# Patient Record
Sex: MALE | Race: WHITE | NOT HISPANIC OR LATINO | Employment: OTHER | URBAN - NONMETROPOLITAN AREA
[De-identification: names, ages, dates, MRNs, and addresses within clinical notes are randomized per-mention and may not be internally consistent; named-entity substitution may affect disease eponyms.]

---

## 2017-01-16 ENCOUNTER — TELEPHONE (OUTPATIENT)
Dept: CARDIOLOGY | Facility: CLINIC | Age: 67
End: 2017-01-16

## 2017-01-16 RX ORDER — TADALAFIL 5 MG/1
5 TABLET ORAL DAILY PRN
Qty: 30 TABLET | Refills: 2 | COMMUNITY
Start: 2017-01-16 | End: 2017-01-25 | Stop reason: SDUPTHER

## 2017-01-16 NOTE — TELEPHONE ENCOUNTER
Patient called requesting script for Cialis, you had gave samples at last visit. Is it ok to sent script? Thanks

## 2017-01-25 RX ORDER — TADALAFIL 5 MG/1
5 TABLET ORAL DAILY PRN
Qty: 30 TABLET | Refills: 2 | Status: SHIPPED | OUTPATIENT
Start: 2017-01-25 | End: 2017-06-26

## 2017-06-26 ENCOUNTER — OFFICE VISIT (OUTPATIENT)
Dept: CARDIOLOGY | Facility: CLINIC | Age: 67
End: 2017-06-26

## 2017-06-26 VITALS
HEIGHT: 71 IN | BODY MASS INDEX: 32.9 KG/M2 | SYSTOLIC BLOOD PRESSURE: 144 MMHG | HEART RATE: 64 BPM | DIASTOLIC BLOOD PRESSURE: 82 MMHG | WEIGHT: 235 LBS

## 2017-06-26 DIAGNOSIS — I25.9 IHD (ISCHEMIC HEART DISEASE): Primary | ICD-10-CM

## 2017-06-26 DIAGNOSIS — R01.1 MURMUR, CARDIAC: ICD-10-CM

## 2017-06-26 DIAGNOSIS — E78.00 HYPERCHOLESTEREMIA: ICD-10-CM

## 2017-06-26 DIAGNOSIS — E55.9 VITAMIN D DEFICIENCY: ICD-10-CM

## 2017-06-26 DIAGNOSIS — F52.21 ED (ERECTILE DYSFUNCTION) OF NON-ORGANIC ORIGIN: ICD-10-CM

## 2017-06-26 DIAGNOSIS — E88.81 METABOLIC SYNDROME: ICD-10-CM

## 2017-06-26 DIAGNOSIS — I10 ESSENTIAL HYPERTENSION: ICD-10-CM

## 2017-06-26 DIAGNOSIS — R79.89 LOW TESTOSTERONE: ICD-10-CM

## 2017-06-26 DIAGNOSIS — J32.8 OTHER CHRONIC SINUSITIS: ICD-10-CM

## 2017-06-26 PROBLEM — J32.9 CHRONIC SINUSITIS: Status: ACTIVE | Noted: 2017-06-26

## 2017-06-26 PROCEDURE — 99213 OFFICE O/P EST LOW 20 MIN: CPT | Performed by: INTERNAL MEDICINE

## 2017-06-26 RX ORDER — LABETALOL 200 MG/1
TABLET, FILM COATED ORAL
Qty: 180 TABLET | Refills: 2 | Status: SHIPPED | OUTPATIENT
Start: 2017-06-26 | End: 2018-01-23 | Stop reason: SDUPTHER

## 2017-06-26 RX ORDER — EZETIMIBE 10 MG/1
TABLET ORAL
Qty: 90 TABLET | Refills: 2 | Status: SHIPPED | OUTPATIENT
Start: 2017-06-26 | End: 2018-01-23 | Stop reason: SDUPTHER

## 2017-06-26 RX ORDER — CLOPIDOGREL BISULFATE 75 MG/1
TABLET ORAL
Qty: 90 TABLET | Refills: 2 | Status: SHIPPED | OUTPATIENT
Start: 2017-06-26 | End: 2018-01-23 | Stop reason: SDUPTHER

## 2017-06-26 RX ORDER — AMLODIPINE BESYLATE AND BENAZEPRIL HYDROCHLORIDE 10; 20 MG/1; MG/1
CAPSULE ORAL
Qty: 90 CAPSULE | Refills: 2 | Status: SHIPPED | OUTPATIENT
Start: 2017-06-26 | End: 2018-01-23 | Stop reason: SDUPTHER

## 2017-06-26 RX ORDER — ATORVASTATIN CALCIUM 20 MG/1
TABLET, FILM COATED ORAL
Qty: 90 TABLET | Refills: 2 | Status: SHIPPED | OUTPATIENT
Start: 2017-06-26 | End: 2017-06-26

## 2017-06-26 RX ORDER — MONTELUKAST SODIUM 10 MG/1
10 TABLET ORAL NIGHTLY
Qty: 90 TABLET | Refills: 3 | Status: SHIPPED | OUTPATIENT
Start: 2017-06-26 | End: 2018-01-23 | Stop reason: SDUPTHER

## 2017-06-26 RX ORDER — LOSARTAN POTASSIUM AND HYDROCHLOROTHIAZIDE 25; 100 MG/1; MG/1
TABLET ORAL
Qty: 90 TABLET | Refills: 2 | Status: SHIPPED | OUTPATIENT
Start: 2017-06-26 | End: 2018-01-23 | Stop reason: SDUPTHER

## 2017-06-26 NOTE — PROGRESS NOTES
Chief Complaint   Patient presents with   • Follow-up     Denies chest pain, shortness of breath, or palpitations. All his meds were refilled today. Labs per his insurance wellness program about a month ago.        CARDIAC COMPLAINTS  Sinusitis        Subjective   Edgardo Ortez is a 67 y.o. male came in today for his follow-up visit.  He has history of ischemic heart disease diagnosed in 2005 when he underwent PTCA of the LAD and diagonal, followed by stenting again in 2006.  Since then his cardiac workup is been always negative.  The last echo and stress test was in 2015.  Came today with no new cardiac symptoms.  He does have sinus problem, and has taken some prednisone and antibiotics, but still continues to have a allergic sinusitis.  He apparently had some labs done few months ago, but not sure about the results.              Cardiac History  Past Surgical History:   Procedure Laterality Date   • CARDIAC CATHETERIZATION  2005    PCI of LAD & D1     • CARDIAC CATHETERIZATION  11/02/2006    75% LAD- 3.0 x 33 mm Cypher. 70% D1 2.5 x 18 mm Cypher Stent.     • CARDIOVASCULAR STRESS TEST  05/29/2007    10 Min 75% THR.Negative     • CARDIOVASCULAR STRESS TEST  05/18/2009    10 min, 98% tHR. Negative     • CARDIOVASCULAR STRESS TEST  07/12/2010    10 min. 83% THR. BP- 160/70. Anterior Ischemia.     • CARDIOVASCULAR STRESS TEST  09/08/2011    10 Min.85%THR. Negative     • CARDIOVASCULAR STRESS TEST  08/13/2013    10 Min,15 Secs. 89% THR. BP- 172/90. Negative.     • CARDIOVASCULAR STRESS TEST  04/13/2015    10 Min, 84% THR. BP- 184/78. Negative     • CONVERTED (HISTORICAL) DUPLEX SCANS  04/13/2015    Carotid US- Normal.     • ECHO - CONVERTED  05/29/2007     EF 65%     • ECHO - CONVERTED  05/18/2009    EF >60%, Mild MR.     • ECHO - CONVERTED  07/12/2010    EF >60%, RVSP- 32.09mmHg.     • ECHO - CONVERTED  09/08/2011     EF 65%.     • ECHO - CONVERTED  08/13/2013    EF 65%. RVSp- 39 mmHg.     • ECHO - CONVERTED   04/13/2015    EF 65%         Current Outpatient Prescriptions   Medication Sig Dispense Refill   • amLODIPine-benazepril (LOTREL) 10-20 MG per capsule TAKE ONE CAPSULE BY MOUTH EVERY DAY 90 capsule 2   • aspirin 81 MG EC tablet Take 81 mg by mouth Daily.     • atorvastatin (LIPITOR) 10 MG tablet Take 10 mg by mouth Daily.     • clopidogrel (PLAVIX) 75 MG tablet TAKE ONE TABLET BY MOUTH EVERY DAY 90 tablet 2   • labetalol (NORMODYNE) 200 MG tablet TAKE TWO TABLETS BY MOUTH ONCE DAILY 180 tablet 2   • losartan-hydrochlorothiazide (HYZAAR) 100-25 MG per tablet TAKE ONE TABLET BY MOUTH EVERY DAY 90 tablet 2   • ZETIA 10 MG tablet TAKE ONE TABLET BY MOUTH EVERY DAY 90 tablet 2   • montelukast (SINGULAIR) 10 MG tablet Take 1 tablet by mouth Every Night. 90 tablet 3     No current facility-administered medications for this visit.        Allergies  :  Review of patient's allergies indicates no known allergies.       Past Medical History:   Diagnosis Date   • H/O prostate biopsy     negative   • Hypercholesterolemia    • Hypertension    • IHD (ischemic heart disease)     S/P stenting of LAD & diagonal   • MR (mitral regurgitation)     mild   • pulse oximetry 07/07/2010    Overnight oximetry- Abnormal, sleep study ordered, pt. wants to lose wt. first.       Social History     Social History   • Marital status:      Spouse name: N/A   • Number of children: N/A   • Years of education: N/A     Occupational History   • Not on file.     Social History Main Topics   • Smoking status: Never Smoker   • Smokeless tobacco: Never Used   • Alcohol use Yes      Comment: states he drinks beer or scotch every other day   • Drug use: No   • Sexual activity: Not on file     Other Topics Concern   • Not on file     Social History Narrative       Family History   Problem Relation Age of Onset   • Heart disease Mother    • Heart disease Father        Review of Systems   Constitution: Negative for decreased appetite and malaise/fatigue.  "  HENT: Positive for congestion and headaches.    Eyes: Negative for blurred vision.   Cardiovascular: Negative for chest pain, leg swelling and orthopnea.   Respiratory: Negative for shortness of breath and snoring.    Endocrine: Negative for cold intolerance and heat intolerance.   Hematologic/Lymphatic: Negative for adenopathy. Does not bruise/bleed easily.   Skin: Negative for itching, nail changes and skin cancer.   Musculoskeletal: Negative for arthritis and myalgias.   Gastrointestinal: Negative for abdominal pain, dysphagia and heartburn.   Genitourinary: Negative for bladder incontinence and frequency.   Neurological: Negative for dizziness, light-headedness, seizures and vertigo.   Psychiatric/Behavioral: Negative for altered mental status.   Allergic/Immunologic: Negative for environmental allergies and hives.       Diabetes- No  Thyroid- normal    Objective     /82  Pulse 64  Ht 71\" (180.3 cm)  Wt 235 lb (107 kg)  BMI 32.78 kg/m2    Physical Exam   Constitutional: He is oriented to person, place, and time. He appears well-nourished.   HENT:   Head: Normocephalic.   Eyes: Pupils are equal, round, and reactive to light.   Neck: Normal range of motion.   Cardiovascular: Normal rate, regular rhythm, S1 normal and S2 normal.    Murmur heard.  Pulmonary/Chest: Breath sounds normal.   Abdominal: Soft. Bowel sounds are normal.   Musculoskeletal: Normal range of motion.   Neurological: He is alert and oriented to person, place, and time.   Skin: Skin is warm.   Psychiatric: He has a normal mood and affect.     Procedures            Assessment/Plan     Edgardo was seen today for follow-up.    Diagnoses and all orders for this visit:    IHD (ischemic heart disease)  -     CBC & Differential; Future  -     High Sensitivity CRP; Future    Essential hypertension  -     Comprehensive Metabolic Panel; Future    Hypercholesteremia  -     Lipid Panel; Future    Metabolic syndrome    Murmur, cardiac    Other " chronic sinusitis  -     montelukast (SINGULAIR) 10 MG tablet; Take 1 tablet by mouth Every Night.    Low testosterone  -     Testosterone; Future    Vitamin D deficiency  -     Vitamin D 1,25 Dihydroxy; Future       His heart rate and blood pressure appears stable.  BMI is still around 33.  Talked to him about diet exercise and weight reduction.  Continue the same medications for now.  Recheck his labs including electrolytes and LFT's.  I started him on singular 10 mg once a day to help with his sinus problem and advised him to take Claritin in the morning, overall his cardiac status appears stable,                  Electronically signed by Ovidio Warren MD June 26, 2017 4:09 PM

## 2018-01-23 ENCOUNTER — OFFICE VISIT (OUTPATIENT)
Dept: CARDIOLOGY | Facility: CLINIC | Age: 68
End: 2018-01-23

## 2018-01-23 VITALS
BODY MASS INDEX: 32.9 KG/M2 | SYSTOLIC BLOOD PRESSURE: 138 MMHG | WEIGHT: 235 LBS | DIASTOLIC BLOOD PRESSURE: 82 MMHG | HEIGHT: 71 IN | HEART RATE: 64 BPM

## 2018-01-23 DIAGNOSIS — R05.9 COUGH: ICD-10-CM

## 2018-01-23 DIAGNOSIS — I10 ESSENTIAL HYPERTENSION: ICD-10-CM

## 2018-01-23 DIAGNOSIS — J32.8 OTHER CHRONIC SINUSITIS: ICD-10-CM

## 2018-01-23 DIAGNOSIS — I25.9 IHD (ISCHEMIC HEART DISEASE): Primary | ICD-10-CM

## 2018-01-23 DIAGNOSIS — E88.81 METABOLIC SYNDROME: ICD-10-CM

## 2018-01-23 DIAGNOSIS — E78.00 HYPERCHOLESTEREMIA: ICD-10-CM

## 2018-01-23 PROBLEM — E88.810 METABOLIC SYNDROME: Status: ACTIVE | Noted: 2018-01-23

## 2018-01-23 PROCEDURE — 99213 OFFICE O/P EST LOW 20 MIN: CPT | Performed by: INTERNAL MEDICINE

## 2018-01-23 RX ORDER — EZETIMIBE 10 MG/1
10 TABLET ORAL DAILY
Qty: 90 TABLET | Refills: 3 | Status: SHIPPED | OUTPATIENT
Start: 2018-01-23 | End: 2018-07-23 | Stop reason: SDUPTHER

## 2018-01-23 RX ORDER — ATORVASTATIN CALCIUM 20 MG/1
20 TABLET, FILM COATED ORAL DAILY
Qty: 90 TABLET | Refills: 3 | Status: SHIPPED | OUTPATIENT
Start: 2018-01-23 | End: 2018-07-23 | Stop reason: SDUPTHER

## 2018-01-23 RX ORDER — MONTELUKAST SODIUM 10 MG/1
10 TABLET ORAL NIGHTLY
Qty: 90 TABLET | Refills: 3 | Status: SHIPPED | OUTPATIENT
Start: 2018-01-23 | End: 2018-07-23 | Stop reason: SDUPTHER

## 2018-01-23 RX ORDER — AMLODIPINE BESYLATE AND BENAZEPRIL HYDROCHLORIDE 10; 20 MG/1; MG/1
1 CAPSULE ORAL DAILY
Qty: 90 CAPSULE | Refills: 3 | Status: SHIPPED | OUTPATIENT
Start: 2018-01-23 | End: 2018-07-23 | Stop reason: SDUPTHER

## 2018-01-23 RX ORDER — LABETALOL 200 MG/1
200 TABLET, FILM COATED ORAL EVERY 12 HOURS SCHEDULED
Qty: 180 TABLET | Refills: 3 | Status: SHIPPED | OUTPATIENT
Start: 2018-01-23 | End: 2018-07-23 | Stop reason: SDUPTHER

## 2018-01-23 RX ORDER — LOSARTAN POTASSIUM AND HYDROCHLOROTHIAZIDE 25; 100 MG/1; MG/1
1 TABLET ORAL DAILY
Qty: 90 TABLET | Refills: 3 | Status: SHIPPED | OUTPATIENT
Start: 2018-01-23 | End: 2018-07-23 | Stop reason: SDUPTHER

## 2018-01-23 RX ORDER — ATORVASTATIN CALCIUM 20 MG/1
20 TABLET, FILM COATED ORAL DAILY
COMMUNITY
End: 2018-01-23 | Stop reason: SDUPTHER

## 2018-01-23 RX ORDER — CLOPIDOGREL BISULFATE 75 MG/1
75 TABLET ORAL DAILY
Qty: 90 TABLET | Refills: 3 | Status: SHIPPED | OUTPATIENT
Start: 2018-01-23 | End: 2018-07-23 | Stop reason: SDUPTHER

## 2018-01-23 NOTE — PROGRESS NOTES
Chief Complaint   Patient presents with   • Follow-up     for cardiac management   • Med Refill     refills needed on cardiac meds. 90 days to [facility].        CARDIAC COMPLAINTS  Cardiac Management        Subjective   Edgardo Ortez is a 67 y.o. male came in today for his follow-up visit.  He has history of ischemic heart disease diagnosed in 2005 when he underwent stenting of the LAD followed by repeat stenting in 2006.  His last cardiac workup was in 2015.  He came today with no new symptoms.  His last lab work was about 6 months ago and it was normal.  He denies having any chest pain or shortness of breath.  He has some recent sinus congestion and has some cough.  He denies having any fever.  He did hit his flu vaccine.  He has not had his pneumonia vaccine.              Cardiac History  Past Surgical History:   Procedure Laterality Date   • CARDIAC CATHETERIZATION  2005    PCI of LAD & D1     • CARDIAC CATHETERIZATION  11/02/2006    75% LAD- 3.0 x 33 mm Cypher. 70% D1 2.5 x 18 mm Cypher Stent.     • CARDIOVASCULAR STRESS TEST  05/29/2007    10 Min 75% THR.Negative     • CARDIOVASCULAR STRESS TEST  05/18/2009    10 min, 98% tHR. Negative     • CARDIOVASCULAR STRESS TEST  07/12/2010    10 min. 83% THR. BP- 160/70. Anterior Ischemia.     • CARDIOVASCULAR STRESS TEST  09/08/2011    10 Min.85%THR. Negative     • CARDIOVASCULAR STRESS TEST  08/13/2013    10 Min,15 Secs. 89% THR. BP- 172/90. Negative.     • CARDIOVASCULAR STRESS TEST  04/13/2015    10 Min, 84% THR. BP- 184/78. Negative     • CONVERTED (HISTORICAL) DUPLEX SCANS  04/13/2015    Carotid US- Normal.     • ECHO - CONVERTED  05/29/2007     EF 65%     • ECHO - CONVERTED  05/18/2009    EF >60%, Mild MR.     • ECHO - CONVERTED  07/12/2010    EF >60%, RVSP- 32.09mmHg.     • ECHO - CONVERTED  09/08/2011     EF 65%.     • ECHO - CONVERTED  08/13/2013    EF 65%. RVSp- 39 mmHg.     • ECHO - CONVERTED  04/13/2015    EF 65%         Current Outpatient  Prescriptions   Medication Sig Dispense Refill   • amLODIPine-benazepril (LOTREL) 10-20 MG per capsule Take 1 capsule by mouth Daily. 90 capsule 3   • aspirin 81 MG EC tablet Take 81 mg by mouth Daily.     • atorvastatin (LIPITOR) 20 MG tablet Take 1 tablet by mouth Daily. 90 tablet 3   • clopidogrel (PLAVIX) 75 MG tablet Take 1 tablet by mouth Daily. 90 tablet 3   • ezetimibe (ZETIA) 10 MG tablet Take 1 tablet by mouth Daily. 90 tablet 3   • labetalol (NORMODYNE) 200 MG tablet Take 1 tablet by mouth Every 12 (Twelve) Hours. 180 tablet 3   • losartan-hydrochlorothiazide (HYZAAR) 100-25 MG per tablet Take 1 tablet by mouth Daily. 90 tablet 3   • montelukast (SINGULAIR) 10 MG tablet Take 1 tablet by mouth Every Night. 90 tablet 3     No current facility-administered medications for this visit.        Allergies  :  Review of patient's allergies indicates no known allergies.       Past Medical History:   Diagnosis Date   • H/O prostate biopsy     negative   • Hypercholesterolemia    • Hypertension    • IHD (ischemic heart disease)     S/P stenting of LAD & diagonal   • MR (mitral regurgitation)     mild   • pulse oximetry 07/07/2010    Overnight oximetry- Abnormal, sleep study ordered, pt. wants to lose wt. first.       Social History     Social History   • Marital status:      Spouse name: N/A   • Number of children: N/A   • Years of education: N/A     Occupational History   • Not on file.     Social History Main Topics   • Smoking status: Never Smoker   • Smokeless tobacco: Never Used   • Alcohol use Yes      Comment: states he drinks beer or scotch every other day   • Drug use: No   • Sexual activity: Not on file     Other Topics Concern   • Not on file     Social History Narrative       Family History   Problem Relation Age of Onset   • Heart disease Mother    • Heart disease Father        Review of Systems   Constitution: Negative for decreased appetite and malaise/fatigue.   HENT: Positive for congestion.  "Negative for sore throat.    Eyes: Negative for blurred vision.   Cardiovascular: Negative for chest pain and palpitations.   Respiratory: Positive for cough. Negative for shortness of breath and snoring.    Endocrine: Negative for cold intolerance and heat intolerance.   Hematologic/Lymphatic: Negative for adenopathy. Does not bruise/bleed easily.   Skin: Negative for itching, nail changes and skin cancer.   Musculoskeletal: Negative for arthritis and myalgias.   Gastrointestinal: Negative for abdominal pain, dysphagia and heartburn.   Genitourinary: Negative for bladder incontinence and frequency.   Neurological: Negative for dizziness, light-headedness, seizures and vertigo.   Psychiatric/Behavioral: Negative for altered mental status.   Allergic/Immunologic: Negative for environmental allergies and hives.       Diabetes- No  Thyroid- normal    Objective     /82  Pulse 64  Ht 180.3 cm (71\")  Wt 107 kg (235 lb)  BMI 32.78 kg/m2    Physical Exam   Constitutional: He is oriented to person, place, and time. He appears well-developed and well-nourished.   HENT:   Head: Normocephalic.   Nose: Nose normal.   Eyes: Pupils are equal, round, and reactive to light.   Neck: Normal range of motion.   Cardiovascular: Normal rate, regular rhythm, S1 normal and S2 normal.    No murmur heard.  Pulmonary/Chest: Effort normal and breath sounds normal.   Abdominal: Soft. Bowel sounds are normal.   Musculoskeletal: Normal range of motion. He exhibits no edema.   Neurological: He is alert and oriented to person, place, and time.   Skin: Skin is warm and dry.   Psychiatric: He has a normal mood and affect.     Procedures            Assessment/Plan     Edgardo was seen today for follow-up and med refill.    Diagnoses and all orders for this visit:    IHD (ischemic heart disease)  -     clopidogrel (PLAVIX) 75 MG tablet; Take 1 tablet by mouth Daily.  -     CBC & Differential; Future    Essential hypertension  -     " losartan-hydrochlorothiazide (HYZAAR) 100-25 MG per tablet; Take 1 tablet by mouth Daily.  -     labetalol (NORMODYNE) 200 MG tablet; Take 1 tablet by mouth Every 12 (Twelve) Hours.  -     amLODIPine-benazepril (LOTREL) 10-20 MG per capsule; Take 1 capsule by mouth Daily.  -     Comprehensive Metabolic Panel; Future    Hypercholesteremia  -     ezetimibe (ZETIA) 10 MG tablet; Take 1 tablet by mouth Daily.  -     atorvastatin (LIPITOR) 20 MG tablet; Take 1 tablet by mouth Daily.  -     Lipid Panel; Future    Metabolic syndrome    Cough    Other chronic sinusitis  -     montelukast (SINGULAIR) 10 MG tablet; Take 1 tablet by mouth Every Night.       Heart rate and blood pressure appears stable.  His BMI is still around 33.  I talked to him again about his diet especially low carb diet.  Continue the same medication for now.  At this time I don't think he needs to undergo any more cardiac workup.  I did advise him to repeat his labs.  Prescription for his medications were given.  I talked to him about getting a pneumonia vaccine also.  I'll see him back in 6 months or sooner if needed.                  Electronically signed by Ovidio Warren MD January 23, 2018 4:36 PM

## 2018-07-23 ENCOUNTER — LAB (OUTPATIENT)
Dept: LAB | Facility: HOSPITAL | Age: 68
End: 2018-07-23
Attending: INTERNAL MEDICINE

## 2018-07-23 ENCOUNTER — OFFICE VISIT (OUTPATIENT)
Dept: CARDIOLOGY | Facility: CLINIC | Age: 68
End: 2018-07-23

## 2018-07-23 VITALS
WEIGHT: 236 LBS | SYSTOLIC BLOOD PRESSURE: 160 MMHG | HEART RATE: 68 BPM | DIASTOLIC BLOOD PRESSURE: 88 MMHG | HEIGHT: 71 IN | BODY MASS INDEX: 33.04 KG/M2

## 2018-07-23 DIAGNOSIS — R01.1 MURMUR, CARDIAC: ICD-10-CM

## 2018-07-23 DIAGNOSIS — I25.9 IHD (ISCHEMIC HEART DISEASE): ICD-10-CM

## 2018-07-23 DIAGNOSIS — I25.9 IHD (ISCHEMIC HEART DISEASE): Primary | ICD-10-CM

## 2018-07-23 DIAGNOSIS — E78.00 HYPERCHOLESTEREMIA: ICD-10-CM

## 2018-07-23 DIAGNOSIS — I10 ESSENTIAL HYPERTENSION: ICD-10-CM

## 2018-07-23 DIAGNOSIS — J32.8 OTHER CHRONIC SINUSITIS: ICD-10-CM

## 2018-07-23 DIAGNOSIS — E88.81 METABOLIC SYNDROME: ICD-10-CM

## 2018-07-23 DIAGNOSIS — I34.0 MITRAL VALVE INSUFFICIENCY, UNSPECIFIED ETIOLOGY: ICD-10-CM

## 2018-07-23 LAB
ALBUMIN SERPL-MCNC: 4.5 G/DL (ref 3.4–4.8)
ALBUMIN/GLOB SERPL: 1.7 G/DL (ref 1.5–2.5)
ALP SERPL-CCNC: 73 U/L (ref 40–129)
ALT SERPL W P-5'-P-CCNC: 20 U/L (ref 10–44)
ANION GAP SERPL CALCULATED.3IONS-SCNC: 6.4 MMOL/L (ref 3.6–11.2)
AST SERPL-CCNC: 23 U/L (ref 10–34)
BASOPHILS # BLD AUTO: 0.05 10*3/MM3 (ref 0–0.3)
BASOPHILS NFR BLD AUTO: 0.8 % (ref 0–2)
BILIRUB SERPL-MCNC: 0.6 MG/DL (ref 0.2–1.8)
BUN BLD-MCNC: 17 MG/DL (ref 7–21)
BUN/CREAT SERPL: 17.3 (ref 7–25)
CALCIUM SPEC-SCNC: 9.5 MG/DL (ref 7.7–10)
CHLORIDE SERPL-SCNC: 105 MMOL/L (ref 99–112)
CHOLEST SERPL-MCNC: 124 MG/DL (ref 0–200)
CO2 SERPL-SCNC: 30.6 MMOL/L (ref 24.3–31.9)
CREAT BLD-MCNC: 0.98 MG/DL (ref 0.43–1.29)
DEPRECATED RDW RBC AUTO: 39.8 FL (ref 37–54)
EOSINOPHIL # BLD AUTO: 0.3 10*3/MM3 (ref 0–0.7)
EOSINOPHIL NFR BLD AUTO: 5 % (ref 0–7)
ERYTHROCYTE [DISTWIDTH] IN BLOOD BY AUTOMATED COUNT: 12.9 % (ref 11.5–14.5)
GFR SERPL CREATININE-BSD FRML MDRD: 76 ML/MIN/1.73
GLOBULIN UR ELPH-MCNC: 2.6 GM/DL
GLUCOSE BLD-MCNC: 104 MG/DL (ref 70–110)
HCT VFR BLD AUTO: 40.2 % (ref 42–52)
HDLC SERPL-MCNC: 51 MG/DL (ref 60–100)
HGB BLD-MCNC: 13.8 G/DL (ref 14–18)
IMM GRANULOCYTES # BLD: 0.01 10*3/MM3 (ref 0–0.03)
IMM GRANULOCYTES NFR BLD: 0.2 % (ref 0–0.5)
LDLC SERPL CALC-MCNC: 61 MG/DL (ref 0–100)
LDLC/HDLC SERPL: 1.19 {RATIO}
LYMPHOCYTES # BLD AUTO: 1.25 10*3/MM3 (ref 1–3)
LYMPHOCYTES NFR BLD AUTO: 20.7 % (ref 16–46)
MCH RBC QN AUTO: 30 PG (ref 27–33)
MCHC RBC AUTO-ENTMCNC: 34.3 G/DL (ref 33–37)
MCV RBC AUTO: 87.4 FL (ref 80–94)
MONOCYTES # BLD AUTO: 0.54 10*3/MM3 (ref 0.1–0.9)
MONOCYTES NFR BLD AUTO: 8.9 % (ref 0–12)
NEUTROPHILS # BLD AUTO: 3.9 10*3/MM3 (ref 1.4–6.5)
NEUTROPHILS NFR BLD AUTO: 64.4 % (ref 40–75)
OSMOLALITY SERPL CALC.SUM OF ELEC: 285 MOSM/KG (ref 273–305)
PLATELET # BLD AUTO: 268 10*3/MM3 (ref 130–400)
PMV BLD AUTO: 10.1 FL (ref 6–10)
POTASSIUM BLD-SCNC: 3.8 MMOL/L (ref 3.5–5.3)
PROT SERPL-MCNC: 7.1 G/DL (ref 6–8)
RBC # BLD AUTO: 4.6 10*6/MM3 (ref 4.7–6.1)
SODIUM BLD-SCNC: 142 MMOL/L (ref 135–153)
TRIGL SERPL-MCNC: 61 MG/DL (ref 0–150)
VLDLC SERPL-MCNC: 12.2 MG/DL
WBC NRBC COR # BLD: 6.05 10*3/MM3 (ref 4.5–12.5)

## 2018-07-23 PROCEDURE — 99213 OFFICE O/P EST LOW 20 MIN: CPT | Performed by: INTERNAL MEDICINE

## 2018-07-23 PROCEDURE — 85025 COMPLETE CBC W/AUTO DIFF WBC: CPT | Performed by: INTERNAL MEDICINE

## 2018-07-23 PROCEDURE — 36415 COLL VENOUS BLD VENIPUNCTURE: CPT

## 2018-07-23 PROCEDURE — 80061 LIPID PANEL: CPT | Performed by: INTERNAL MEDICINE

## 2018-07-23 PROCEDURE — 80053 COMPREHEN METABOLIC PANEL: CPT | Performed by: INTERNAL MEDICINE

## 2018-07-23 RX ORDER — AMLODIPINE BESYLATE AND BENAZEPRIL HYDROCHLORIDE 10; 20 MG/1; MG/1
1 CAPSULE ORAL DAILY
Qty: 90 CAPSULE | Refills: 3 | Status: SHIPPED | OUTPATIENT
Start: 2018-07-23 | End: 2019-01-22 | Stop reason: SDUPTHER

## 2018-07-23 RX ORDER — LOSARTAN POTASSIUM AND HYDROCHLOROTHIAZIDE 25; 100 MG/1; MG/1
1 TABLET ORAL DAILY
Qty: 90 TABLET | Refills: 3 | Status: SHIPPED | OUTPATIENT
Start: 2018-07-23 | End: 2019-01-22 | Stop reason: SDUPTHER

## 2018-07-23 RX ORDER — LABETALOL 200 MG/1
200 TABLET, FILM COATED ORAL EVERY 12 HOURS SCHEDULED
Qty: 180 TABLET | Refills: 3 | Status: SHIPPED | OUTPATIENT
Start: 2018-07-23 | End: 2019-01-22 | Stop reason: SDUPTHER

## 2018-07-23 RX ORDER — ATORVASTATIN CALCIUM 20 MG/1
20 TABLET, FILM COATED ORAL DAILY
Qty: 90 TABLET | Refills: 3 | Status: SHIPPED | OUTPATIENT
Start: 2018-07-23 | End: 2019-01-22 | Stop reason: SDUPTHER

## 2018-07-23 RX ORDER — EZETIMIBE 10 MG/1
10 TABLET ORAL DAILY
Qty: 90 TABLET | Refills: 3 | Status: SHIPPED | OUTPATIENT
Start: 2018-07-23 | End: 2019-01-22 | Stop reason: SDUPTHER

## 2018-07-23 RX ORDER — CLOPIDOGREL BISULFATE 75 MG/1
75 TABLET ORAL DAILY
Qty: 90 TABLET | Refills: 3 | Status: SHIPPED | OUTPATIENT
Start: 2018-07-23 | End: 2019-01-22 | Stop reason: SDUPTHER

## 2018-07-23 RX ORDER — MONTELUKAST SODIUM 10 MG/1
10 TABLET ORAL NIGHTLY
Qty: 90 TABLET | Refills: 3 | Status: SHIPPED | OUTPATIENT
Start: 2018-07-23 | End: 2019-01-22 | Stop reason: SDUPTHER

## 2018-07-23 NOTE — PATIENT INSTRUCTIONS
"DASH Eating Plan  DASH stands for \"Dietary Approaches to Stop Hypertension.\" The DASH eating plan is a healthy eating plan that has been shown to reduce high blood pressure (hypertension). It may also reduce your risk for type 2 diabetes, heart disease, and stroke. The DASH eating plan may also help with weight loss.  What are tips for following this plan?  General guidelines  · Avoid eating more than 2,300 mg (milligrams) of salt (sodium) a day. If you have hypertension, you may need to reduce your sodium intake to 1,500 mg a day.  · Limit alcohol intake to no more than 1 drink a day for nonpregnant women and 2 drinks a day for men. One drink equals 12 oz of beer, 5 oz of wine, or 1½ oz of hard liquor.  · Work with your health care provider to maintain a healthy body weight or to lose weight. Ask what an ideal weight is for you.  · Get at least 30 minutes of exercise that causes your heart to beat faster (aerobic exercise) most days of the week. Activities may include walking, swimming, or biking.  · Work with your health care provider or diet and nutrition specialist (dietitian) to adjust your eating plan to your individual calorie needs.  Reading food labels  · Check food labels for the amount of sodium per serving. Choose foods with less than 5 percent of the Daily Value of sodium. Generally, foods with less than 300 mg of sodium per serving fit into this eating plan.  · To find whole grains, look for the word \"whole\" as the first word in the ingredient list.  Shopping  · Buy products labeled as \"low-sodium\" or \"no salt added.\"  · Buy fresh foods. Avoid canned foods and premade or frozen meals.  Cooking  · Avoid adding salt when cooking. Use salt-free seasonings or herbs instead of table salt or sea salt. Check with your health care provider or pharmacist before using salt substitutes.  · Do not phan foods. Cook foods using healthy methods such as baking, boiling, grilling, and broiling instead.  · Cook with " heart-healthy oils, such as olive, canola, soybean, or sunflower oil.  Meal planning    · Eat a balanced diet that includes:  ? 5 or more servings of fruits and vegetables each day. At each meal, try to fill half of your plate with fruits and vegetables.  ? Up to 6-8 servings of whole grains each day.  ? Less than 6 oz of lean meat, poultry, or fish each day. A 3-oz serving of meat is about the same size as a deck of cards. One egg equals 1 oz.  ? 2 servings of low-fat dairy each day.  ? A serving of nuts, seeds, or beans 5 times each week.  ? Heart-healthy fats. Healthy fats called Omega-3 fatty acids are found in foods such as flaxseeds and coldwater fish, like sardines, salmon, and mackerel.  · Limit how much you eat of the following:  ? Canned or prepackaged foods.  ? Food that is high in trans fat, such as fried foods.  ? Food that is high in saturated fat, such as fatty meat.  ? Sweets, desserts, sugary drinks, and other foods with added sugar.  ? Full-fat dairy products.  · Do not salt foods before eating.  · Try to eat at least 2 vegetarian meals each week.  · Eat more home-cooked food and less restaurant, buffet, and fast food.  · When eating at a restaurant, ask that your food be prepared with less salt or no salt, if possible.  What foods are recommended?  The items listed may not be a complete list. Talk with your dietitian about what dietary choices are best for you.  Grains  Whole-grain or whole-wheat bread. Whole-grain or whole-wheat pasta. Brown rice. Oatmeal. Quinoa. Bulgur. Whole-grain and low-sodium cereals. Stacy bread. Low-fat, low-sodium crackers. Whole-wheat flour tortillas.  Vegetables  Fresh or frozen vegetables (raw, steamed, roasted, or grilled). Low-sodium or reduced-sodium tomato and vegetable juice. Low-sodium or reduced-sodium tomato sauce and tomato paste. Low-sodium or reduced-sodium canned vegetables.  Fruits  All fresh, dried, or frozen fruit. Canned fruit in natural juice (without  added sugar).  Meat and other protein foods  Skinless chicken or turkey. Ground chicken or turkey. Pork with fat trimmed off. Fish and seafood. Egg whites. Dried beans, peas, or lentils. Unsalted nuts, nut butters, and seeds. Unsalted canned beans. Lean cuts of beef with fat trimmed off. Low-sodium, lean deli meat.  Dairy  Low-fat (1%) or fat-free (skim) milk. Fat-free, low-fat, or reduced-fat cheeses. Nonfat, low-sodium ricotta or cottage cheese. Low-fat or nonfat yogurt. Low-fat, low-sodium cheese.  Fats and oils  Soft margarine without trans fats. Vegetable oil. Low-fat, reduced-fat, or light mayonnaise and salad dressings (reduced-sodium). Canola, safflower, olive, soybean, and sunflower oils. Avocado.  Seasoning and other foods  Herbs. Spices. Seasoning mixes without salt. Unsalted popcorn and pretzels. Fat-free sweets.  What foods are not recommended?  The items listed may not be a complete list. Talk with your dietitian about what dietary choices are best for you.  Grains  Baked goods made with fat, such as croissants, muffins, or some breads. Dry pasta or rice meal packs.  Vegetables  Creamed or fried vegetables. Vegetables in a cheese sauce. Regular canned vegetables (not low-sodium or reduced-sodium). Regular canned tomato sauce and paste (not low-sodium or reduced-sodium). Regular tomato and vegetable juice (not low-sodium or reduced-sodium). Pickles. Olives.  Fruits  Canned fruit in a light or heavy syrup. Fried fruit. Fruit in cream or butter sauce.  Meat and other protein foods  Fatty cuts of meat. Ribs. Fried meat. Peters. Sausage. Bologna and other processed lunch meats. Salami. Fatback. Hotdogs. Bratwurst. Salted nuts and seeds. Canned beans with added salt. Canned or smoked fish. Whole eggs or egg yolks. Chicken or turkey with skin.  Dairy  Whole or 2% milk, cream, and half-and-half. Whole or full-fat cream cheese. Whole-fat or sweetened yogurt. Full-fat cheese. Nondairy creamers. Whipped toppings.  Processed cheese and cheese spreads.  Fats and oils  Butter. Stick margarine. Lard. Shortening. Ghee. Peters fat. Tropical oils, such as coconut, palm kernel, or palm oil.  Seasoning and other foods  Salted popcorn and pretzels. Onion salt, garlic salt, seasoned salt, table salt, and sea salt. Worcestershire sauce. Tartar sauce. Barbecue sauce. Teriyaki sauce. Soy sauce, including reduced-sodium. Steak sauce. Canned and packaged gravies. Fish sauce. Oyster sauce. Cocktail sauce. Horseradish that you find on the shelf. Ketchup. Mustard. Meat flavorings and tenderizers. Bouillon cubes. Hot sauce and Tabasco sauce. Premade or packaged marinades. Premade or packaged taco seasonings. Relishes. Regular salad dressings.  Where to find more information:  · National Heart, Lung, and Blood [place]: www.nhlbi.nih.gov  · American Heart Association: www.heart.org  Summary  · The DASH eating plan is a healthy eating plan that has been shown to reduce high blood pressure (hypertension). It may also reduce your risk for type 2 diabetes, heart disease, and stroke.  · With the DASH eating plan, you should limit salt (sodium) intake to 2,300 mg a day. If you have hypertension, you may need to reduce your sodium intake to 1,500 mg a day.  · When on the DASH eating plan, aim to eat more fresh fruits and vegetables, whole grains, lean proteins, low-fat dairy, and heart-healthy fats.  · Work with your health care provider or diet and nutrition specialist (dietitian) to adjust your eating plan to your individual calorie needs.  This information is not intended to replace advice given to you by your health care provider. Make sure you discuss any questions you have with your health care provider.  Document Released: 12/06/2012 Document Revised: 12/11/2017 Document Reviewed: 12/11/2017  [employer] Patient Education © 2018 [employer]

## 2018-07-23 NOTE — PROGRESS NOTES
Chief Complaint   Patient presents with   • Follow-up     for cardiac management   • Med Refill     refills needed on cardiac meds, 90 days to [facility]   • Labs     labs from today are in chart       CARDIAC COMPLAINTS  Cardiac Management        Subjective   Edgardo Ortez is a 68 y.o. male came in today for his follow-up visit.  He has history of ischemic heart disease diagnosed in 2005 when he underwent stenting of the LAD followed by repeat stenting in 2006.  Since then he has been managed medically.  His last stress test was in 2015.  He came today with no new symptoms.  He has not been exercising as regularly.  He has been gaining weight a little bit.  He had his lab work done recently and the cholesterol is very well controlled.              Cardiac History  Past Surgical History:   Procedure Laterality Date   • CARDIAC CATHETERIZATION  2005    PCI of LAD & D1     • CARDIAC CATHETERIZATION  11/02/2006    75% LAD- 3.0 x 33 mm Cypher. 70% D1 2.5 x 18 mm Cypher Stent.     • CARDIOVASCULAR STRESS TEST  05/29/2007    10 Min 75% THR.Negative     • CARDIOVASCULAR STRESS TEST  05/18/2009    10 min, 98% tHR. Negative     • CARDIOVASCULAR STRESS TEST  07/12/2010    10 min. 83% THR. BP- 160/70. Anterior Ischemia.     • CARDIOVASCULAR STRESS TEST  09/08/2011    10 Min.85%THR. Negative     • CARDIOVASCULAR STRESS TEST  08/13/2013    10 Min,15 Secs. 89% THR. BP- 172/90. Negative.     • CARDIOVASCULAR STRESS TEST  04/13/2015    10 Min, 84% THR. BP- 184/78. Negative     • CONVERTED (HISTORICAL) DUPLEX SCANS  04/13/2015    Carotid US- Normal.     • ECHO - CONVERTED  05/29/2007     EF 65%     • ECHO - CONVERTED  05/18/2009    EF >60%, Mild MR.     • ECHO - CONVERTED  07/12/2010    EF >60%, RVSP- 32.09mmHg.     • ECHO - CONVERTED  09/08/2011     EF 65%.     • ECHO - CONVERTED  08/13/2013    EF 65%. RVSp- 39 mmHg.     • ECHO - CONVERTED  04/13/2015    EF 65%         Current Outpatient Prescriptions   Medication Sig  Dispense Refill   • amLODIPine-benazepril (LOTREL) 10-20 MG per capsule Take 1 capsule by mouth Daily. 90 capsule 3   • aspirin 81 MG EC tablet Take 81 mg by mouth Daily.     • atorvastatin (LIPITOR) 20 MG tablet Take 1 tablet by mouth Daily. 90 tablet 3   • clopidogrel (PLAVIX) 75 MG tablet Take 1 tablet by mouth Daily. 90 tablet 3   • ezetimibe (ZETIA) 10 MG tablet Take 1 tablet by mouth Daily. 90 tablet 3   • labetalol (NORMODYNE) 200 MG tablet Take 1 tablet by mouth Every 12 (Twelve) Hours. 180 tablet 3   • losartan-hydrochlorothiazide (HYZAAR) 100-25 MG per tablet Take 1 tablet by mouth Daily. 90 tablet 3   • montelukast (SINGULAIR) 10 MG tablet Take 1 tablet by mouth Every Night. 90 tablet 3     No current facility-administered medications for this visit.        Allergies  :  Patient has no known allergies.       Past Medical History:   Diagnosis Date   • H/O prostate biopsy     negative   • Hypercholesterolemia    • Hypertension    • IHD (ischemic heart disease)     S/P stenting of LAD & diagonal   • MR (mitral regurgitation)     mild   • pulse oximetry 07/07/2010    Overnight oximetry- Abnormal, sleep study ordered, pt. wants to lose wt. first.       Social History     Social History   • Marital status:      Spouse name: N/A   • Number of children: N/A   • Years of education: N/A     Occupational History   • Not on file.     Social History Main Topics   • Smoking status: Never Smoker   • Smokeless tobacco: Never Used   • Alcohol use Yes      Comment: states he drinks beer or scotch every other day   • Drug use: No   • Sexual activity: Not on file     Other Topics Concern   • Not on file     Social History Narrative   • No narrative on file       Family History   Problem Relation Age of Onset   • Heart disease Mother    • Heart disease Father        Review of Systems   Constitution: Negative for decreased appetite and malaise/fatigue.   HENT: Negative for congestion and sore throat.    Eyes: Negative  "for blurred vision.   Cardiovascular: Negative for chest pain and leg swelling.   Respiratory: Negative for shortness of breath and snoring.    Endocrine: Negative for cold intolerance and heat intolerance.   Hematologic/Lymphatic: Negative for adenopathy. Does not bruise/bleed easily.   Skin: Negative for itching, nail changes and skin cancer.   Musculoskeletal: Negative for arthritis and myalgias.   Gastrointestinal: Negative for abdominal pain, dysphagia and heartburn.   Genitourinary: Negative for bladder incontinence and frequency.   Neurological: Negative for dizziness, light-headedness, seizures and vertigo.   Psychiatric/Behavioral: Negative for altered mental status.   Allergic/Immunologic: Negative for environmental allergies and hives.       Diabetes- No  Thyroid- normal    Objective     /88   Pulse 68   Ht 180.3 cm (71\")   Wt 107 kg (236 lb)   BMI 32.92 kg/m²     Physical Exam   Constitutional: He is oriented to person, place, and time. He appears well-developed and well-nourished.   HENT:   Head: Normocephalic.   Nose: Nose normal.   Eyes: Pupils are equal, round, and reactive to light. EOM are normal.   Neck: Normal range of motion. Neck supple.   Cardiovascular: Normal rate, regular rhythm, S1 normal and S2 normal.    Murmur heard.  Pulmonary/Chest: Effort normal and breath sounds normal.   Abdominal: Soft. Bowel sounds are normal.   Musculoskeletal: Normal range of motion. He exhibits no edema.   Neurological: He is alert and oriented to person, place, and time.   Skin: Skin is warm and dry.   Psychiatric: He has a normal mood and affect.     Procedures            Assessment/Plan   Patient's Body mass index is 32.92 kg/m². BMI is above normal parameters. Recommendations include: educational material, exercise counseling and nutrition counseling.     Edgardo was seen today for follow-up, med refill and labs.    Diagnoses and all orders for this visit:    IHD (ischemic heart disease)  -     " clopidogrel (PLAVIX) 75 MG tablet; Take 1 tablet by mouth Daily.    Essential hypertension  -     losartan-hydrochlorothiazide (HYZAAR) 100-25 MG per tablet; Take 1 tablet by mouth Daily.  -     labetalol (NORMODYNE) 200 MG tablet; Take 1 tablet by mouth Every 12 (Twelve) Hours.  -     amLODIPine-benazepril (LOTREL) 10-20 MG per capsule; Take 1 capsule by mouth Daily.    Hypercholesteremia  -     ezetimibe (ZETIA) 10 MG tablet; Take 1 tablet by mouth Daily.  -     atorvastatin (LIPITOR) 20 MG tablet; Take 1 tablet by mouth Daily.    Metabolic syndrome    Mitral valve insufficiency, unspecified etiology    Murmur, cardiac    Other chronic sinusitis  -     montelukast (SINGULAIR) 10 MG tablet; Take 1 tablet by mouth Every Night.         At baseline, his heart rate is stable but the blood pressure is moderately elevated.  His BMI is 33.  I had a long talk with him about his dietary habit.  Talked to him about cutting down on the salt intake and also talked to him about his carbohydrate intake.  Will continue the same medication for now.  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  Overall his cardiac status appears stable.                Electronically signed by Ovidio Warren MD July 23, 2018 4:05 PM

## 2019-01-03 ENCOUNTER — TELEPHONE (OUTPATIENT)
Dept: CARDIOLOGY | Facility: CLINIC | Age: 69
End: 2019-01-03

## 2019-01-03 DIAGNOSIS — I10 ESSENTIAL HYPERTENSION: ICD-10-CM

## 2019-01-03 DIAGNOSIS — E78.00 HYPERCHOLESTEREMIA: ICD-10-CM

## 2019-01-03 DIAGNOSIS — I25.9 IHD (ISCHEMIC HEART DISEASE): Primary | ICD-10-CM

## 2019-01-03 NOTE — TELEPHONE ENCOUNTER
Brandon SORENSEN, has an appointment to see you on 1/22/19. Do you want labs? If so just put in orders for our lab.

## 2019-01-07 ENCOUNTER — LAB (OUTPATIENT)
Dept: LAB | Facility: HOSPITAL | Age: 69
End: 2019-01-07
Attending: INTERNAL MEDICINE

## 2019-01-07 DIAGNOSIS — I25.9 IHD (ISCHEMIC HEART DISEASE): ICD-10-CM

## 2019-01-07 DIAGNOSIS — I10 ESSENTIAL HYPERTENSION: ICD-10-CM

## 2019-01-07 DIAGNOSIS — E78.00 HYPERCHOLESTEREMIA: ICD-10-CM

## 2019-01-07 LAB
ALBUMIN SERPL-MCNC: 4.6 G/DL (ref 3.4–4.8)
ALBUMIN/GLOB SERPL: 1.8 G/DL (ref 1.5–2.5)
ALP SERPL-CCNC: 67 U/L (ref 40–129)
ALT SERPL W P-5'-P-CCNC: 27 U/L (ref 10–44)
ANION GAP SERPL CALCULATED.3IONS-SCNC: 7 MMOL/L (ref 3.6–11.2)
AST SERPL-CCNC: 24 U/L (ref 10–34)
BASOPHILS # BLD AUTO: 0.04 10*3/MM3 (ref 0–0.3)
BASOPHILS NFR BLD AUTO: 0.7 % (ref 0–2)
BILIRUB SERPL-MCNC: 0.9 MG/DL (ref 0.2–1.8)
BUN BLD-MCNC: 19 MG/DL (ref 7–21)
BUN/CREAT SERPL: 20.2 (ref 7–25)
CALCIUM SPEC-SCNC: 9.5 MG/DL (ref 7.7–10)
CHLORIDE SERPL-SCNC: 105 MMOL/L (ref 99–112)
CHOLEST SERPL-MCNC: 134 MG/DL (ref 0–200)
CO2 SERPL-SCNC: 30 MMOL/L (ref 24.3–31.9)
CREAT BLD-MCNC: 0.94 MG/DL (ref 0.43–1.29)
DEPRECATED RDW RBC AUTO: 38.8 FL (ref 37–54)
EOSINOPHIL # BLD AUTO: 0.24 10*3/MM3 (ref 0–0.7)
EOSINOPHIL NFR BLD AUTO: 4.3 % (ref 0–7)
ERYTHROCYTE [DISTWIDTH] IN BLOOD BY AUTOMATED COUNT: 12.6 % (ref 11.5–14.5)
GFR SERPL CREATININE-BSD FRML MDRD: 80 ML/MIN/1.73
GLOBULIN UR ELPH-MCNC: 2.5 GM/DL
GLUCOSE BLD-MCNC: 92 MG/DL (ref 70–110)
HCT VFR BLD AUTO: 39.5 % (ref 42–52)
HDLC SERPL-MCNC: 59 MG/DL (ref 60–100)
HGB BLD-MCNC: 13.8 G/DL (ref 14–18)
IMM GRANULOCYTES # BLD AUTO: 0.01 10*3/MM3 (ref 0–0.03)
IMM GRANULOCYTES NFR BLD AUTO: 0.2 % (ref 0–0.5)
LDLC SERPL CALC-MCNC: 57 MG/DL (ref 0–100)
LDLC/HDLC SERPL: 0.96 {RATIO}
LYMPHOCYTES # BLD AUTO: 1.41 10*3/MM3 (ref 1–3)
LYMPHOCYTES NFR BLD AUTO: 25.2 % (ref 16–46)
MCH RBC QN AUTO: 30.3 PG (ref 27–33)
MCHC RBC AUTO-ENTMCNC: 34.9 G/DL (ref 33–37)
MCV RBC AUTO: 86.6 FL (ref 80–94)
MONOCYTES # BLD AUTO: 0.54 10*3/MM3 (ref 0.1–0.9)
MONOCYTES NFR BLD AUTO: 9.6 % (ref 0–12)
NEUTROPHILS # BLD AUTO: 3.36 10*3/MM3 (ref 1.4–6.5)
NEUTROPHILS NFR BLD AUTO: 60 % (ref 40–75)
OSMOLALITY SERPL CALC.SUM OF ELEC: 285 MOSM/KG (ref 273–305)
PLATELET # BLD AUTO: 274 10*3/MM3 (ref 130–400)
PMV BLD AUTO: 9.7 FL (ref 6–10)
POTASSIUM BLD-SCNC: 3.8 MMOL/L (ref 3.5–5.3)
PROT SERPL-MCNC: 7.1 G/DL (ref 6–8)
RBC # BLD AUTO: 4.56 10*6/MM3 (ref 4.7–6.1)
SODIUM BLD-SCNC: 142 MMOL/L (ref 135–153)
TRIGL SERPL-MCNC: 92 MG/DL (ref 0–150)
VLDLC SERPL-MCNC: 18.4 MG/DL
WBC NRBC COR # BLD: 5.6 10*3/MM3 (ref 4.5–12.5)

## 2019-01-07 PROCEDURE — 36415 COLL VENOUS BLD VENIPUNCTURE: CPT

## 2019-01-07 PROCEDURE — 85025 COMPLETE CBC W/AUTO DIFF WBC: CPT | Performed by: INTERNAL MEDICINE

## 2019-01-07 PROCEDURE — 80061 LIPID PANEL: CPT | Performed by: INTERNAL MEDICINE

## 2019-01-07 PROCEDURE — 80053 COMPREHEN METABOLIC PANEL: CPT | Performed by: INTERNAL MEDICINE

## 2019-01-22 ENCOUNTER — OFFICE VISIT (OUTPATIENT)
Dept: CARDIOLOGY | Facility: CLINIC | Age: 69
End: 2019-01-22

## 2019-01-22 VITALS
SYSTOLIC BLOOD PRESSURE: 130 MMHG | BODY MASS INDEX: 33.46 KG/M2 | HEART RATE: 72 BPM | HEIGHT: 71 IN | WEIGHT: 239 LBS | DIASTOLIC BLOOD PRESSURE: 80 MMHG

## 2019-01-22 DIAGNOSIS — R01.1 MURMUR, CARDIAC: ICD-10-CM

## 2019-01-22 DIAGNOSIS — I25.9 IHD (ISCHEMIC HEART DISEASE): Primary | ICD-10-CM

## 2019-01-22 DIAGNOSIS — J32.8 OTHER CHRONIC SINUSITIS: ICD-10-CM

## 2019-01-22 DIAGNOSIS — R07.89 OTHER CHEST PAIN: ICD-10-CM

## 2019-01-22 DIAGNOSIS — E88.81 METABOLIC SYNDROME: ICD-10-CM

## 2019-01-22 DIAGNOSIS — I25.6 SILENT MYOCARDIAL ISCHEMIA: ICD-10-CM

## 2019-01-22 DIAGNOSIS — G47.30 SLEEP APNEA IN ADULT: ICD-10-CM

## 2019-01-22 DIAGNOSIS — E78.00 HYPERCHOLESTEREMIA: ICD-10-CM

## 2019-01-22 DIAGNOSIS — I10 ESSENTIAL HYPERTENSION: ICD-10-CM

## 2019-01-22 PROCEDURE — 99214 OFFICE O/P EST MOD 30 MIN: CPT | Performed by: INTERNAL MEDICINE

## 2019-01-22 RX ORDER — LOSARTAN POTASSIUM AND HYDROCHLOROTHIAZIDE 25; 100 MG/1; MG/1
1 TABLET ORAL DAILY
Qty: 90 TABLET | Refills: 3 | Status: SHIPPED | OUTPATIENT
Start: 2019-01-22 | End: 2019-07-23 | Stop reason: SDUPTHER

## 2019-01-22 RX ORDER — ATORVASTATIN CALCIUM 20 MG/1
20 TABLET, FILM COATED ORAL DAILY
Qty: 90 TABLET | Refills: 3 | Status: SHIPPED | OUTPATIENT
Start: 2019-01-22 | End: 2019-07-23 | Stop reason: SDUPTHER

## 2019-01-22 RX ORDER — EZETIMIBE 10 MG/1
10 TABLET ORAL DAILY
Qty: 90 TABLET | Refills: 3 | Status: SHIPPED | OUTPATIENT
Start: 2019-01-22 | End: 2019-01-22

## 2019-01-22 RX ORDER — CLOPIDOGREL BISULFATE 75 MG/1
75 TABLET ORAL DAILY
Qty: 90 TABLET | Refills: 3 | Status: SHIPPED | OUTPATIENT
Start: 2019-01-22 | End: 2019-07-23 | Stop reason: SDUPTHER

## 2019-01-22 RX ORDER — AMLODIPINE BESYLATE AND BENAZEPRIL HYDROCHLORIDE 10; 20 MG/1; MG/1
1 CAPSULE ORAL DAILY
Qty: 90 CAPSULE | Refills: 3 | Status: SHIPPED | OUTPATIENT
Start: 2019-01-22 | End: 2019-07-23 | Stop reason: SDUPTHER

## 2019-01-22 RX ORDER — MONTELUKAST SODIUM 10 MG/1
10 TABLET ORAL NIGHTLY
Qty: 90 TABLET | Refills: 3 | Status: SHIPPED | OUTPATIENT
Start: 2019-01-22 | End: 2020-02-11 | Stop reason: SDUPTHER

## 2019-01-22 RX ORDER — LABETALOL 200 MG/1
200 TABLET, FILM COATED ORAL EVERY 12 HOURS SCHEDULED
Qty: 180 TABLET | Refills: 3 | Status: SHIPPED | OUTPATIENT
Start: 2019-01-22 | End: 2019-07-23 | Stop reason: SDUPTHER

## 2019-01-22 NOTE — PROGRESS NOTES
Chief Complaint   Patient presents with   • Follow-up     for cardiac management    • Med Refill     refills needed on cardiac meds.  90 days to [facility].    • Labs     in chart       CARDIAC COMPLAINTS  chest pressure/discomfort and fatigue        Subjective   Edgardo Ortez is a 68 y.o. male came in today for his follow-up visit.  He has history of ischemic heart disease diagnosed in 2005 when he underwent stenting of the LAD and diagonal and had more stents placed in 2006.  His last cardiac workup was in 2015 which was normal.  He came today with no significant cardiac symptoms.  He has been having some right-sided chest pain which gets worse when he bends down or rotate his chest.  It is been going on for the last few weeks.  His last lab work showed his cholesterol is very well controlled with the LDL less than 60.  His rest of his labs were normal.  He denies having any dizziness.  He does do some work out with a .  He started noticing increasing fatigue and takes more time to regain strength back.              Cardiac History  Past Surgical History:   Procedure Laterality Date   • CARDIAC CATHETERIZATION  2005    PCI of LAD & D1     • CARDIAC CATHETERIZATION  11/02/2006    75% LAD- 3.0 x 33 mm Cypher. 70% D1 2.5 x 18 mm Cypher Stent.     • CARDIOVASCULAR STRESS TEST  05/29/2007    10 Min 75% THR.Negative     • CARDIOVASCULAR STRESS TEST  05/18/2009    10 min, 98% tHR. Negative     • CARDIOVASCULAR STRESS TEST  07/12/2010    10 min. 83% THR. BP- 160/70. Anterior Ischemia.     • CARDIOVASCULAR STRESS TEST  09/08/2011    10 Min.85%THR. Negative     • CARDIOVASCULAR STRESS TEST  08/13/2013    10 Min,15 Secs. 89% THR. BP- 172/90. Negative.     • CARDIOVASCULAR STRESS TEST  04/13/2015    10 Min, 84% THR. BP- 184/78. Negative     • CONVERTED (HISTORICAL) DUPLEX SCANS  04/13/2015    Carotid US- Normal.     • ECHO - CONVERTED  05/29/2007     EF 65%     • ECHO - CONVERTED  05/18/2009    EF  >60%, Mild MR.     • ECHO - CONVERTED  07/12/2010    EF >60%, RVSP- 32.09mmHg.     • ECHO - CONVERTED  09/08/2011     EF 65%.     • ECHO - CONVERTED  08/13/2013    EF 65%. RVSp- 39 mmHg.     • ECHO - CONVERTED  04/13/2015    EF 65%         Current Outpatient Medications   Medication Sig Dispense Refill   • amLODIPine-benazepril (LOTREL) 10-20 MG per capsule Take 1 capsule by mouth Daily. 90 capsule 3   • aspirin 81 MG EC tablet Take 81 mg by mouth Daily.     • atorvastatin (LIPITOR) 20 MG tablet Take 1 tablet by mouth Daily. 90 tablet 3   • clopidogrel (PLAVIX) 75 MG tablet Take 1 tablet by mouth Daily. 90 tablet 3   • labetalol (NORMODYNE) 200 MG tablet Take 1 tablet by mouth Every 12 (Twelve) Hours. 180 tablet 3   • losartan-hydrochlorothiazide (HYZAAR) 100-25 MG per tablet Take 1 tablet by mouth Daily. 90 tablet 3   • montelukast (SINGULAIR) 10 MG tablet Take 1 tablet by mouth Every Night. 90 tablet 3     No current facility-administered medications for this visit.        Allergies  :  Patient has no known allergies.       Past Medical History:   Diagnosis Date   • H/O prostate biopsy     negative   • Hypercholesterolemia    • Hypertension    • IHD (ischemic heart disease)     S/P stenting of LAD & diagonal   • MR (mitral regurgitation)     mild   • pulse oximetry 07/07/2010    Overnight oximetry- Abnormal, sleep study ordered, pt. wants to lose wt. first.       Social History     Socioeconomic History   • Marital status:      Spouse name: Not on file   • Number of children: Not on file   • Years of education: Not on file   • Highest education level: Not on file   Social Needs   • Financial resource strain: Not on file   • Food insecurity - worry: Not on file   • Food insecurity - inability: Not on file   • Transportation needs - medical: Not on file   • Transportation needs - non-medical: Not on file   Occupational History   • Not on file   Tobacco Use   • Smoking status: Never Smoker   • Smokeless  "tobacco: Never Used   Substance and Sexual Activity   • Alcohol use: Yes     Comment: states he drinks beer or scotch every other day   • Drug use: No   • Sexual activity: Not on file   Other Topics Concern   • Not on file   Social History Narrative   • Not on file       Family History   Problem Relation Age of Onset   • Heart disease Mother    • Heart disease Father        Review of Systems   Constitution: Positive for malaise/fatigue. Negative for decreased appetite.   HENT: Negative for congestion and sore throat.    Eyes: Negative for blurred vision.   Cardiovascular: Positive for chest pain and dyspnea on exertion.   Respiratory: Positive for shortness of breath. Negative for snoring.    Endocrine: Negative for cold intolerance and heat intolerance.   Hematologic/Lymphatic: Negative for adenopathy. Does not bruise/bleed easily.   Skin: Negative for itching, nail changes and skin cancer.   Musculoskeletal: Negative for arthritis and myalgias.   Gastrointestinal: Negative for abdominal pain, dysphagia and heartburn.   Genitourinary: Negative for bladder incontinence and frequency.   Neurological: Negative for dizziness, light-headedness, seizures and vertigo.   Psychiatric/Behavioral: Negative for altered mental status.   Allergic/Immunologic: Negative for environmental allergies and hives.       Diabetes- No  Thyroid- normal    Objective     /80   Pulse 72   Ht 180.3 cm (71\")   Wt 108 kg (239 lb)   BMI 33.33 kg/m²     Physical Exam   Constitutional: He is oriented to person, place, and time. He appears well-developed and well-nourished.   HENT:   Head: Normocephalic.   Nose: Nose normal.   Eyes: EOM are normal. Pupils are equal, round, and reactive to light.   Neck: Normal range of motion. Neck supple.   Cardiovascular: Normal rate, regular rhythm, S1 normal and S2 normal.   Murmur heard.  Pulmonary/Chest: Effort normal and breath sounds normal.   Abdominal: Soft. Bowel sounds are normal. "   Musculoskeletal: Normal range of motion. He exhibits no edema.   Neurological: He is alert and oriented to person, place, and time.   Skin: Skin is warm and dry.   Psychiatric: He has a normal mood and affect.     Procedures            Assessment/Plan   Patient's Body mass index is 33.33 kg/m². BMI is above normal parameters. Recommendations include: educational material, exercise counseling and nutrition counseling.     Edgardo was seen today for follow-up, med refill and labs.    Diagnoses and all orders for this visit:    IHD (ischemic heart disease)  -     clopidogrel (PLAVIX) 75 MG tablet; Take 1 tablet by mouth Daily.  -     Stress Test With Myocardial Perfusion One Day; Future  -     Adult Transthoracic Echo Complete W/ Cont if Necessary Per Protocol; Future    Essential hypertension  -     amLODIPine-benazepril (LOTREL) 10-20 MG per capsule; Take 1 capsule by mouth Daily.  -     labetalol (NORMODYNE) 200 MG tablet; Take 1 tablet by mouth Every 12 (Twelve) Hours.  -     losartan-hydrochlorothiazide (HYZAAR) 100-25 MG per tablet; Take 1 tablet by mouth Daily.    Hypercholesteremia  -     atorvastatin (LIPITOR) 20 MG tablet; Take 1 tablet by mouth Daily.  -     Discontinue: ezetimibe (ZETIA) 10 MG tablet; Take 1 tablet by mouth Daily.    Metabolic syndrome    Murmur, cardiac  -     Adult Transthoracic Echo Complete W/ Cont if Necessary Per Protocol; Future    Sleep apnea in adult    Other chronic sinusitis  -     montelukast (SINGULAIR) 10 MG tablet; Take 1 tablet by mouth Every Night.    Silent myocardial ischemia  -     Stress Test With Myocardial Perfusion One Day; Future    Other chest pain  -     Stress Test With Myocardial Perfusion One Day; Future       At baseline, his heart rate and blood pressure appears stable.  His BMI as gone up to 33.  I had a very long talk with him again about diet and weight reduction.  Gave him papers on Mediterranean diet.  The chest pain he has appears to be more  musculoskeletal, but I cannot rule out any other causes.  If he continues to have the pain he may need to undergo a MRI of his back to make sure there is no root compression.  Regarding his cardiac status, it is been almost 4 years since he had his last stress test.  Given his increasing fatigue and reduced effort tolerance, need to rule out silent ischemia.  I scheduled him to undergo a stress test to evaluate his functional status, chronotropic response and to look for stress-induced ischemia.  He also need an echocardiogram to evaluate the LV function and the PA pressure.  I did advise him to stop his LDL at this time to make sure that it is not due to myalgia causing the chest pain.  Based on the results, further recommendations will be made.                  Electronically signed by Ovidio Warren MD January 22, 2019 3:09 PM

## 2019-01-22 NOTE — PATIENT INSTRUCTIONS
Mediterranean Diet  A Mediterranean diet refers to food and lifestyle choices that are based on the traditions of countries located on the [place]. This way of eating has been shown to help prevent certain conditions and improve outcomes for people who have chronic diseases, like kidney disease and heart disease.  What are tips for following this plan?  Lifestyle  · Cook and eat meals together with your family, when possible.  · Drink enough fluid to keep your urine clear or pale yellow.  · Be physically active every day. This includes:  ? Aerobic exercise like running or swimming.  ? Leisure activities like gardening, walking, or housework.  · Get 7-8 hours of sleep each night.  · If recommended by your health care provider, drink red wine in moderation. This means 1 glass a day for nonpregnant women and 2 glasses a day for men. A glass of wine equals 5 oz (150 mL).  Reading food labels  · Check the serving size of packaged foods. For foods such as rice and pasta, the serving size refers to the amount of cooked product, not dry.  · Check the total fat in packaged foods. Avoid foods that have saturated fat or trans fats.  · Check the ingredients list for added sugars, such as corn syrup.  Shopping  · At the grocery store, buy most of your food from the areas near the walls of the store. This includes:  ? Fresh fruits and vegetables (produce).  ? Grains, beans, nuts, and seeds. Some of these may be available in unpackaged forms or large amounts (in bulk).  ? Fresh seafood.  ? Poultry and eggs.  ? Low-fat dairy products.  · Buy whole ingredients instead of prepackaged foods.  · Buy fresh fruits and vegetables in-season from local farmers markets.  · Buy frozen fruits and vegetables in resealable bags.  · If you do not have access to quality fresh seafood, buy precooked frozen shrimp or canned fish, such as tuna, salmon, or sardines.  · Buy small amounts of raw or cooked vegetables, salads, or olives from the  deli or salad bar at your store.  · Stock your pantry so you always have certain foods on hand, such as olive oil, canned tuna, canned tomatoes, rice, pasta, and beans.  Cooking  · Cook foods with extra-virgin olive oil instead of using butter or other vegetable oils.  · Have meat as a side dish, and have vegetables or grains as your main dish. This means having meat in small portions or adding small amounts of meat to foods like pasta or stew.  · Use beans or vegetables instead of meat in common dishes like chili or lasagna.  · [place] with different cooking methods. Try roasting or broiling vegetables instead of steaming or sautéeing them.  · Add frozen vegetables to soups, stews, pasta, or rice.  · Add nuts or seeds for added healthy fat at each meal. You can add these to yogurt, salads, or vegetable dishes.  · Marinate fish or vegetables using olive oil, lemon juice, garlic, and fresh herbs.  Meal planning  · Plan to eat 1 vegetarian meal one day each week. Try to work up to 2 vegetarian meals, if possible.  · Eat seafood 2 or more times a week.  · Have healthy snacks readily available, such as:  ? Vegetable sticks with hummus.  ? Greek yogurt.  ? Fruit and nut trail mix.  · Eat balanced meals throughout the week. This includes:  ? Fruit: 2-3 servings a day  ? Vegetables: 4-5 servings a day  ? Low-fat dairy: 2 servings a day  ? Fish, poultry, or lean meat: 1 serving a day  ? Beans and legumes: 2 or more servings a week  ? Nuts and seeds: 1-2 servings a day  ? Whole grains: 6-8 servings a day  ? Extra-virgin olive oil: 3-4 servings a day  · Limit red meat and sweets to only a few servings a month  What are my food choices?  · Mediterranean diet  ? Recommended  ? Grains: Whole-grain pasta. Brown rice. Bulgar wheat. Polenta. Couscous. Whole-wheat bread. Oatmeal. Quinoa.  ? Vegetables: Artichokes. Beets. Broccoli. Cabbage. Carrots. Eggplant. Green beans. Chard. Kale. Spinach. Onions. Leeks. Peas. Squash.  Tomatoes. Peppers. Radishes.  ? Fruits: Apples. Apricots. Avocado. Berries. Bananas. Cherries. Dates. Figs. Grapes. [place]. Melon. Oranges. Peaches. Plums. Pomegranate.  ? Meats and other protein foods: Beans. Almonds. Sunflower seeds. Pine nuts. Peanuts. Cod. [place]. Scallops. Shrimp. Tuna. Tilapia. Clams. Oysters. Eggs.  ? Dairy: Low-fat milk. Cheese. Greek yogurt.  ? Beverages: Water. Red wine. Herbal tea.  ? Fats and oils: Extra virgin olive oil. Avocado oil. Grape seed oil.  ? Sweets and desserts: Greek yogurt with honey. Baked apples. Poached pears. Trail mix.  ? Seasoning and other foods: Basil. Cilantro. Coriander. Cumin. Mint. Parsley. [place]. Rosemary. Tarragon. Garlic. Oregano. Thyme. Pepper. Balsalmic vinegar. Tahini. Hummus. Tomato sauce. Olives. Mushrooms.  ? Limit these  ? Grains: Prepackaged pasta or rice dishes. Prepackaged cereal with added sugar.  ? Vegetables: Deep fried potatoes (french fries).  ? Fruits: Fruit canned in syrup.  ? Meats and other protein foods: Beef. Pork. Lamb. Poultry with skin. Hot dogs. Peters.  ? Dairy: Ice cream. Sour cream. Whole milk.  ? Beverages: Juice. Sugar-sweetened soft drinks. Beer. Liquor and spirits.  ? Fats and oils: Butter. Canola oil. Vegetable oil. Beef fat (tallow). Lard.  ? Sweets and desserts: Cookies. Cakes. Pies. Candy.  ? Seasoning and other foods: Mayonnaise. Premade sauces and marinades.  ? The items listed may not be a complete list. Talk with your dietitian about what dietary choices are right for you.  Summary  · The Mediterranean diet includes both food and lifestyle choices.  · Eat a variety of fresh fruits and vegetables, beans, nuts, seeds, and whole grains.  · Limit the amount of red meat and sweets that you eat.  · Talk with your health care provider about whether it is safe for you to drink red wine in moderation. This means 1 glass a day for nonpregnant women and 2 glasses a day for men. A glass of wine equals 5 oz (150 mL).  This information  is not intended to replace advice given to you by your health care provider. Make sure you discuss any questions you have with your health care provider.  Document Released: 08/10/2017 Document Revised: 09/12/2017 Document Reviewed: 08/10/2017  [employer] Interactive Patient Education © 2018 [employer]

## 2019-01-28 ENCOUNTER — APPOINTMENT (OUTPATIENT)
Dept: CARDIOLOGY | Facility: HOSPITAL | Age: 69
End: 2019-01-28
Attending: INTERNAL MEDICINE

## 2019-02-18 ENCOUNTER — HOSPITAL ENCOUNTER (OUTPATIENT)
Dept: CARDIOLOGY | Facility: HOSPITAL | Age: 69
Discharge: HOME | End: 2019-02-18
Attending: INTERNAL MEDICINE

## 2019-02-18 DIAGNOSIS — I25.6 SILENT MYOCARDIAL ISCHEMIA: ICD-10-CM

## 2019-02-18 DIAGNOSIS — I25.9 IHD (ISCHEMIC HEART DISEASE): ICD-10-CM

## 2019-02-18 DIAGNOSIS — R01.1 MURMUR, CARDIAC: ICD-10-CM

## 2019-02-18 DIAGNOSIS — R07.89 OTHER CHEST PAIN: ICD-10-CM

## 2019-02-18 LAB
BH CV ECHO MEAS - ACS: 2.4 CM
BH CV ECHO MEAS - AO MEAN PG (FULL): 1.1 MMHG
BH CV ECHO MEAS - AO MEAN PG: 4.4 MMHG
BH CV ECHO MEAS - AO ROOT AREA (BSA CORRECTED): 1.5
BH CV ECHO MEAS - AO ROOT AREA: 9.4 CM^2
BH CV ECHO MEAS - AO ROOT DIAM: 3.5 CM
BH CV ECHO MEAS - AO V2 MEAN: 98.1 CM/SEC
BH CV ECHO MEAS - AO V2 VTI: 34.2 CM
BH CV ECHO MEAS - AVA(I,A): 3.1 CM^2
BH CV ECHO MEAS - AVA(I,D): 3.1 CM^2
BH CV ECHO MEAS - BSA(HAYCOCK): 2.4 M^2
BH CV ECHO MEAS - BSA: 2.3 M^2
BH CV ECHO MEAS - BZI_BMI: 33.3 KILOGRAMS/M^2
BH CV ECHO MEAS - BZI_METRIC_HEIGHT: 180.3 CM
BH CV ECHO MEAS - BZI_METRIC_WEIGHT: 108.4 KG
BH CV ECHO MEAS - EDV(CUBED): 117.2 ML
BH CV ECHO MEAS - EDV(MOD-SP4): 88 ML
BH CV ECHO MEAS - EDV(TEICH): 112.5 ML
BH CV ECHO MEAS - EF(CUBED): 93.1 %
BH CV ECHO MEAS - EF(MOD-SP4): 73.9 %
BH CV ECHO MEAS - EF(TEICH): 88.6 %
BH CV ECHO MEAS - ESV(CUBED): 8 ML
BH CV ECHO MEAS - ESV(MOD-SP4): 23 ML
BH CV ECHO MEAS - ESV(TEICH): 12.8 ML
BH CV ECHO MEAS - FS: 59.1 %
BH CV ECHO MEAS - IVS/LVPW: 0.87
BH CV ECHO MEAS - IVSD: 1.2 CM
BH CV ECHO MEAS - LA DIMENSION: 4.6 CM
BH CV ECHO MEAS - LA/AO: 1.3
BH CV ECHO MEAS - LV DIASTOLIC VOL/BSA (35-75): 38.7 ML/M^2
BH CV ECHO MEAS - LV IVRT: 0.11 SEC
BH CV ECHO MEAS - LV MASS(C)D: 258 GRAMS
BH CV ECHO MEAS - LV MASS(C)DI: 113.4 GRAMS/M^2
BH CV ECHO MEAS - LV MEAN PG: 3.3 MMHG
BH CV ECHO MEAS - LV SYSTOLIC VOL/BSA (12-30): 10.1 ML/M^2
BH CV ECHO MEAS - LV V1 MEAN: 85.2 CM/SEC
BH CV ECHO MEAS - LV V1 VTI: 31.3 CM
BH CV ECHO MEAS - LVIDD: 4.9 CM
BH CV ECHO MEAS - LVIDS: 2 CM
BH CV ECHO MEAS - LVLD AP4: 8.3 CM
BH CV ECHO MEAS - LVLS AP4: 6.7 CM
BH CV ECHO MEAS - LVOT AREA (M): 3.5 CM^2
BH CV ECHO MEAS - LVOT AREA: 3.4 CM^2
BH CV ECHO MEAS - LVOT DIAM: 2.1 CM
BH CV ECHO MEAS - LVPWD: 1.4 CM
BH CV ECHO MEAS - MV A MAX VEL: 64.2 CM/SEC
BH CV ECHO MEAS - MV DEC SLOPE: 412.1 CM/SEC^2
BH CV ECHO MEAS - MV E MAX VEL: 89.8 CM/SEC
BH CV ECHO MEAS - MV E/A: 1.4
BH CV ECHO MEAS - RAP SYSTOLE: 10 MMHG
BH CV ECHO MEAS - RVDD: 3.8 CM
BH CV ECHO MEAS - RVSP: 35 MMHG
BH CV ECHO MEAS - SI(AO): 140.6 ML/M^2
BH CV ECHO MEAS - SI(CUBED): 48 ML/M^2
BH CV ECHO MEAS - SI(LVOT): 46.3 ML/M^2
BH CV ECHO MEAS - SI(MOD-SP4): 28.6 ML/M^2
BH CV ECHO MEAS - SI(TEICH): 43.8 ML/M^2
BH CV ECHO MEAS - SV(AO): 319.9 ML
BH CV ECHO MEAS - SV(CUBED): 109.2 ML
BH CV ECHO MEAS - SV(LVOT): 105.4 ML
BH CV ECHO MEAS - SV(MOD-SP4): 65 ML
BH CV ECHO MEAS - SV(TEICH): 99.7 ML
BH CV ECHO MEAS - TR MAX VEL: 249.8 CM/SEC
BH CV NUCLEAR PRIOR STUDY: 3
BH CV STRESS RECOVERY BP: NORMAL MMHG
BH CV STRESS RECOVERY HR: 72 BPM
LV EF NUC BP: 60 %
MAXIMAL PREDICTED HEART RATE: 152 BPM
MAXIMAL PREDICTED HEART RATE: 152 BPM
PERCENT MAX PREDICTED HR: 85.53 %
STRESS BASELINE BP: NORMAL MMHG
STRESS BASELINE HR: 46 BPM
STRESS PERCENT HR: 101 %
STRESS POST ESTIMATED WORKLOAD: 12.8 METS
STRESS POST EXERCISE DUR MIN: 10 MIN
STRESS POST EXERCISE DUR SEC: 1 SEC
STRESS POST PEAK BP: NORMAL MMHG
STRESS POST PEAK HR: 130 BPM
STRESS TARGET HR: 129 BPM
STRESS TARGET HR: 129 BPM

## 2019-02-18 PROCEDURE — 93017 CV STRESS TEST TRACING ONLY: CPT

## 2019-02-18 PROCEDURE — 93018 CV STRESS TEST I&R ONLY: CPT | Performed by: INTERNAL MEDICINE

## 2019-02-18 PROCEDURE — 0 TECHNETIUM SESTAMIBI: Performed by: INTERNAL MEDICINE

## 2019-02-18 PROCEDURE — 93306 TTE W/DOPPLER COMPLETE: CPT | Performed by: INTERNAL MEDICINE

## 2019-02-18 PROCEDURE — A9500 TC99M SESTAMIBI: HCPCS | Performed by: INTERNAL MEDICINE

## 2019-02-18 PROCEDURE — 78452 HT MUSCLE IMAGE SPECT MULT: CPT

## 2019-02-18 PROCEDURE — 78452 HT MUSCLE IMAGE SPECT MULT: CPT | Performed by: INTERNAL MEDICINE

## 2019-02-18 PROCEDURE — 93306 TTE W/DOPPLER COMPLETE: CPT

## 2019-02-18 RX ADMIN — TECHNETIUM TC 99M SESTAMIBI 1 DOSE: 1 INJECTION INTRAVENOUS at 08:46

## 2019-02-18 RX ADMIN — TECHNETIUM TC 99M SESTAMIBI 1 DOSE: 1 INJECTION INTRAVENOUS at 09:45

## 2019-07-23 ENCOUNTER — OFFICE VISIT (OUTPATIENT)
Dept: CARDIOLOGY | Facility: CLINIC | Age: 69
End: 2019-07-23

## 2019-07-23 VITALS
WEIGHT: 229 LBS | HEIGHT: 71 IN | DIASTOLIC BLOOD PRESSURE: 76 MMHG | HEART RATE: 68 BPM | BODY MASS INDEX: 32.06 KG/M2 | SYSTOLIC BLOOD PRESSURE: 120 MMHG

## 2019-07-23 DIAGNOSIS — G47.30 SLEEP APNEA IN ADULT: ICD-10-CM

## 2019-07-23 DIAGNOSIS — E88.81 METABOLIC SYNDROME: ICD-10-CM

## 2019-07-23 DIAGNOSIS — R07.89 OTHER CHEST PAIN: ICD-10-CM

## 2019-07-23 DIAGNOSIS — I25.9 IHD (ISCHEMIC HEART DISEASE): Primary | ICD-10-CM

## 2019-07-23 DIAGNOSIS — E78.00 HYPERCHOLESTEREMIA: ICD-10-CM

## 2019-07-23 DIAGNOSIS — I10 ESSENTIAL HYPERTENSION: ICD-10-CM

## 2019-07-23 PROCEDURE — 99213 OFFICE O/P EST LOW 20 MIN: CPT | Performed by: INTERNAL MEDICINE

## 2019-07-23 RX ORDER — AMLODIPINE BESYLATE AND BENAZEPRIL HYDROCHLORIDE 10; 20 MG/1; MG/1
1 CAPSULE ORAL DAILY
Qty: 90 CAPSULE | Refills: 3 | Status: SHIPPED | OUTPATIENT
Start: 2019-07-23 | End: 2020-02-11 | Stop reason: SDUPTHER

## 2019-07-23 RX ORDER — LABETALOL 200 MG/1
200 TABLET, FILM COATED ORAL EVERY 12 HOURS SCHEDULED
Qty: 180 TABLET | Refills: 3 | Status: SHIPPED | OUTPATIENT
Start: 2019-07-23 | End: 2020-02-11 | Stop reason: SDUPTHER

## 2019-07-23 RX ORDER — LOSARTAN POTASSIUM AND HYDROCHLOROTHIAZIDE 25; 100 MG/1; MG/1
1 TABLET ORAL DAILY
Qty: 90 TABLET | Refills: 3 | Status: SHIPPED | OUTPATIENT
Start: 2019-07-23 | End: 2020-02-11 | Stop reason: SDUPTHER

## 2019-07-23 RX ORDER — CLOPIDOGREL BISULFATE 75 MG/1
75 TABLET ORAL DAILY
Qty: 90 TABLET | Refills: 3 | Status: SHIPPED | OUTPATIENT
Start: 2019-07-23 | End: 2020-02-11 | Stop reason: SDUPTHER

## 2019-07-23 RX ORDER — ATORVASTATIN CALCIUM 20 MG/1
20 TABLET, FILM COATED ORAL DAILY
Qty: 90 TABLET | Refills: 3 | Status: SHIPPED | OUTPATIENT
Start: 2019-07-23 | End: 2020-02-11 | Stop reason: SDUPTHER

## 2019-07-23 NOTE — PROGRESS NOTES
Chief Complaint   Patient presents with   • Follow-up     for cardiac management   • Med Refill     refills needed on cardiac meds.  90 days to [facility].    • Labs     none since Jan which is in the chart   • ER visit     just after being here in January, after moving furniture and putting up light, had a lot of pain in right side of chest, history of rib fracture and costochondritis.        CARDIAC COMPLAINTS  chest pressure/discomfort        Subjective   Edgardo Ortez is a 69 y.o. male came in today for his follow-up visit.  He was diagnosed with ischemic heart disease in 2005 when he underwent stenting of the LAD and diagonal followed by repeat stenting in 2006.  Since then he has been managed conservatively.  When I saw him in January he was having a lot of atypical chest pain and repeat stress test and echocardiogram was completely normal.  He apparently had another episode of severe pain in the right side and went to the emergency room.  Work-up showed that he had a old fracture in the right rib.  On further questioning it is noted that he apparently had a fall in November while he was taking a cruise and injured his right side of the chest.  It appears that he must have broken his rib bone and now has developed some mild costochondritis.  It is getting better now.  His lab work which was done in January showed his cholesterol is very well controlled at 134 with the LDL of 57.  He has been trying to exercise and has lost about 10 pounds.              Cardiac History  Past Surgical History:   Procedure Laterality Date   • CARDIAC CATHETERIZATION  2005    PCI of LAD & D1     • CARDIAC CATHETERIZATION  11/02/2006    75% LAD- 3.0 x 33 mm Cypher. 70% D1 2.5 x 18 mm Cypher Stent.     • CARDIOVASCULAR STRESS TEST  05/29/2007    10 Min 75% THR.Negative     • CARDIOVASCULAR STRESS TEST  05/18/2009    10 min, 98% tHR. Negative     • CARDIOVASCULAR STRESS TEST  07/12/2010    10 min. 83% THR. BP- 160/70.  Anterior Ischemia.     • CARDIOVASCULAR STRESS TEST  09/08/2011    10 Min.85%THR. Negative     • CARDIOVASCULAR STRESS TEST  08/13/2013    10 Min,15 Secs. 89% THR. BP- 172/90. Negative.     • CARDIOVASCULAR STRESS TEST  04/13/2015    10 Min, 84% THR. BP- 184/78. Negative     • CARDIOVASCULAR STRESS TEST  02/18/2019    10 Min. 12.8 METS. 88% THR. BP- 172/80. EF 60%. Negative.   • CONVERTED (HISTORICAL) DUPLEX SCANS  04/13/2015    Carotid US- Normal.     • ECHO - CONVERTED  05/29/2007     EF 65%     • ECHO - CONVERTED  05/18/2009    EF >60%, Mild MR.     • ECHO - CONVERTED  07/12/2010    EF >60%, RVSP- 32.09mmHg.     • ECHO - CONVERTED  09/08/2011     EF 65%.     • ECHO - CONVERTED  08/13/2013    EF 65%. RVSp- 39 mmHg.     • ECHO - CONVERTED  04/13/2015    EF 65%     • ECHO - CONVERTED  02/18/2019    EF 60-65%. LA- 4.6 Cm. Mild MR. RVSP- 35 mmHg.       Current Outpatient Medications   Medication Sig Dispense Refill   • amLODIPine-benazepril (LOTREL) 10-20 MG per capsule Take 1 capsule by mouth Daily. 90 capsule 3   • aspirin 81 MG EC tablet Take 81 mg by mouth Daily.     • atorvastatin (LIPITOR) 20 MG tablet Take 1 tablet by mouth Daily. 90 tablet 3   • clopidogrel (PLAVIX) 75 MG tablet Take 1 tablet by mouth Daily. 90 tablet 3   • labetalol (NORMODYNE) 200 MG tablet Take 1 tablet by mouth Every 12 (Twelve) Hours. 180 tablet 3   • losartan-hydrochlorothiazide (HYZAAR) 100-25 MG per tablet Take 1 tablet by mouth Daily. 90 tablet 3   • montelukast (SINGULAIR) 10 MG tablet Take 1 tablet by mouth Every Night. (Patient taking differently: Take 10 mg by mouth At Night As Needed.) 90 tablet 3     No current facility-administered medications for this visit.        Allergies  :  Patient has no known allergies.       Past Medical History:   Diagnosis Date   • H/O prostate biopsy     negative   • Hypercholesterolemia    • Hypertension    • IHD (ischemic heart disease)     S/P stenting of LAD & diagonal   • MR (mitral  "regurgitation)     mild   • pulse oximetry 07/07/2010    Overnight oximetry- Abnormal, sleep study ordered, pt. wants to lose wt. first.       Social History     Socioeconomic History   • Marital status:      Spouse name: Not on file   • Number of children: Not on file   • Years of education: Not on file   • Highest education level: Not on file   Tobacco Use   • Smoking status: Never Smoker   • Smokeless tobacco: Never Used   Substance and Sexual Activity   • Alcohol use: Yes     Comment: states he drinks beer or scotch every other day   • Drug use: No       Family History   Problem Relation Age of Onset   • Heart disease Mother    • Heart disease Father        Review of Systems   Constitution: Positive for malaise/fatigue. Negative for decreased appetite.   HENT: Negative for congestion and sore throat.    Eyes: Negative for blurred vision.   Respiratory: Negative for shortness of breath and snoring.    Endocrine: Negative for cold intolerance and heat intolerance.   Hematologic/Lymphatic: Negative for adenopathy. Does not bruise/bleed easily.   Skin: Negative for itching, nail changes and skin cancer.   Musculoskeletal: Negative for arthritis and myalgias.   Gastrointestinal: Negative for abdominal pain, dysphagia and heartburn.   Genitourinary: Negative for bladder incontinence and frequency.   Neurological: Negative for dizziness, light-headedness, seizures and vertigo.   Psychiatric/Behavioral: Negative for altered mental status.   Allergic/Immunologic: Negative for environmental allergies and hives.       Diabetes- No  Thyroid- normal    Objective     /76   Pulse 68   Ht 180.3 cm (71\")   Wt 104 kg (229 lb)   BMI 31.94 kg/m²     Physical Exam   Constitutional: He is oriented to person, place, and time. He appears well-developed and well-nourished.   HENT:   Head: Normocephalic.   Nose: Nose normal.   Eyes: EOM are normal. Pupils are equal, round, and reactive to light.   Neck: Normal range of " motion. Neck supple.   Cardiovascular: Normal rate, regular rhythm, S1 normal and S2 normal.   Murmur heard.  Pulmonary/Chest: Effort normal and breath sounds normal.   Abdominal: Soft. Bowel sounds are normal.   Musculoskeletal: Normal range of motion. He exhibits no edema.   Neurological: He is alert and oriented to person, place, and time.   Skin: Skin is warm and dry.   Psychiatric: He has a normal mood and affect.     Procedures            Assessment/Plan   Patient's Body mass index is 31.94 kg/m². BMI is above normal parameters. Recommendations include: nutrition counseling.     Edgardo was seen today for follow-up, med refill, labs and er visit.    Diagnoses and all orders for this visit:    IHD (ischemic heart disease)  -     clopidogrel (PLAVIX) 75 MG tablet; Take 1 tablet by mouth Daily.  -     CBC & Differential; Future    Essential hypertension  -     amLODIPine-benazepril (LOTREL) 10-20 MG per capsule; Take 1 capsule by mouth Daily.  -     losartan-hydrochlorothiazide (HYZAAR) 100-25 MG per tablet; Take 1 tablet by mouth Daily.  -     labetalol (NORMODYNE) 200 MG tablet; Take 1 tablet by mouth Every 12 (Twelve) Hours.  -     Comprehensive Metabolic Panel; Future    Hypercholesteremia  -     atorvastatin (LIPITOR) 20 MG tablet; Take 1 tablet by mouth Daily.  -     Lipid Panel; Future    Other chest pain  -     CK; Future    Metabolic syndrome    Sleep apnea in adult       At baseline his heart rate is stable and blood pressure very well controlled.  His BMI is come down to 32.  I encouraged him to continue his exercise program.  At this time continue the same medication including Plavix and aspirin.  I explained to him that if he can continue his weight reduction we will try to reduce the blood pressure medications especially the combination of ACE and ARB, need to be stopped soon.  Overall his cardiac status appears stable.  Reassurance was given.  He is advised to undergo lab work again.  I will see him  back in 6 months or sooner if needed.                  Electronically signed by Ovidio Warren MD July 23, 2019 2:57 PM

## 2019-08-22 ENCOUNTER — RESULTS ENCOUNTER (OUTPATIENT)
Dept: CARDIOLOGY | Facility: CLINIC | Age: 69
End: 2019-08-22

## 2019-08-22 DIAGNOSIS — I25.9 IHD (ISCHEMIC HEART DISEASE): ICD-10-CM

## 2019-08-22 DIAGNOSIS — I10 ESSENTIAL HYPERTENSION: ICD-10-CM

## 2019-08-22 DIAGNOSIS — R07.89 OTHER CHEST PAIN: ICD-10-CM

## 2019-08-22 DIAGNOSIS — E78.00 HYPERCHOLESTEREMIA: ICD-10-CM

## 2019-09-12 ENCOUNTER — TELEPHONE (OUTPATIENT)
Dept: CARDIOLOGY | Facility: CLINIC | Age: 69
End: 2019-09-12

## 2019-09-12 NOTE — TELEPHONE ENCOUNTER
Received fax from Dr. Yan Haines at [facility] for cardiac clearance for patient to have a wide excision and sentinel node biopsy. Patient is on Plavix and they are requesting to hold. According to our records, patient's last stent was done on 11/02/06.      Fax [phone]

## 2020-02-11 ENCOUNTER — OFFICE VISIT (OUTPATIENT)
Dept: CARDIOLOGY | Facility: CLINIC | Age: 70
End: 2020-02-11

## 2020-02-11 VITALS
DIASTOLIC BLOOD PRESSURE: 82 MMHG | HEIGHT: 71 IN | BODY MASS INDEX: 31.92 KG/M2 | SYSTOLIC BLOOD PRESSURE: 124 MMHG | WEIGHT: 228 LBS | HEART RATE: 60 BPM

## 2020-02-11 DIAGNOSIS — G47.30 SLEEP APNEA IN ADULT: ICD-10-CM

## 2020-02-11 DIAGNOSIS — I10 ESSENTIAL HYPERTENSION: ICD-10-CM

## 2020-02-11 DIAGNOSIS — I34.0 MITRAL VALVE INSUFFICIENCY, UNSPECIFIED ETIOLOGY: ICD-10-CM

## 2020-02-11 DIAGNOSIS — E88.81 METABOLIC SYNDROME: ICD-10-CM

## 2020-02-11 DIAGNOSIS — I25.9 IHD (ISCHEMIC HEART DISEASE): Primary | ICD-10-CM

## 2020-02-11 DIAGNOSIS — J32.8 OTHER CHRONIC SINUSITIS: ICD-10-CM

## 2020-02-11 DIAGNOSIS — E78.00 HYPERCHOLESTEREMIA: ICD-10-CM

## 2020-02-11 DIAGNOSIS — R01.1 MURMUR, CARDIAC: ICD-10-CM

## 2020-02-11 PROCEDURE — 99213 OFFICE O/P EST LOW 20 MIN: CPT | Performed by: INTERNAL MEDICINE

## 2020-02-11 RX ORDER — LOSARTAN POTASSIUM AND HYDROCHLOROTHIAZIDE 25; 100 MG/1; MG/1
1 TABLET ORAL DAILY
Qty: 90 TABLET | Refills: 3 | Status: SHIPPED | OUTPATIENT
Start: 2020-02-11 | End: 2020-08-10 | Stop reason: SDUPTHER

## 2020-02-11 RX ORDER — MONTELUKAST SODIUM 10 MG/1
10 TABLET ORAL NIGHTLY
Qty: 90 TABLET | Refills: 4 | Status: SHIPPED | OUTPATIENT
Start: 2020-02-11 | End: 2020-08-10 | Stop reason: SDUPTHER

## 2020-02-11 RX ORDER — CLOPIDOGREL BISULFATE 75 MG/1
75 TABLET ORAL DAILY
Qty: 90 TABLET | Refills: 3 | Status: SHIPPED | OUTPATIENT
Start: 2020-02-11 | End: 2020-08-10 | Stop reason: SDUPTHER

## 2020-02-11 RX ORDER — ATORVASTATIN CALCIUM 20 MG/1
20 TABLET, FILM COATED ORAL DAILY
Qty: 90 TABLET | Refills: 3 | Status: SHIPPED | OUTPATIENT
Start: 2020-02-11 | End: 2020-08-10 | Stop reason: SDUPTHER

## 2020-02-11 RX ORDER — LABETALOL 200 MG/1
200 TABLET, FILM COATED ORAL EVERY 12 HOURS SCHEDULED
Qty: 180 TABLET | Refills: 3 | Status: SHIPPED | OUTPATIENT
Start: 2020-02-11 | End: 2020-08-10 | Stop reason: SDUPTHER

## 2020-02-11 RX ORDER — AMLODIPINE BESYLATE AND BENAZEPRIL HYDROCHLORIDE 10; 20 MG/1; MG/1
1 CAPSULE ORAL DAILY
Qty: 90 CAPSULE | Refills: 3 | Status: SHIPPED | OUTPATIENT
Start: 2020-02-11 | End: 2020-08-10 | Stop reason: SDUPTHER

## 2020-02-11 NOTE — PROGRESS NOTES
Chief Complaint   Patient presents with   • Follow-up     for cardiac management   • Med Refill     refills needed on cardiac meds. 90 days to [facility]    • Melanoma     diagnosed in Sept, surgically removed, no treatment needed   • Labs     most recent labs were Sept for Lipids, in chart       CARDIAC COMPLAINTS  Cardiac Management        Subjective   Edgardo Ortez is a 69 y.o. male came in today for his regular follow-up visit.  He has history of hypertension,  hypercholesterolemia who was first diagnosed with ischemic heart disease in 2005 when he underwent stenting of the LAD and diagonal.  Later in 2006 he had 2 stents placed in the LAD as well as in the diagonal.  Since then he has done fairly well with medication.  His last stress test which was done in February 2019 showed good effort tolerance mild hypertensive blood pressure response and no ischemia.  He came today with no new cardiac symptoms.  He has been to the dermatologist office for a routine checkup and found a lesion in his right arm which apparently turned out to be melanoma.  He did undergo surgery without any complication.  At this time there is no need for any extra treatment for the melanoma it was found earlier and it was small.  He has not had his labs done for a while and is due to have it now.  He denies having any kind of chest pain he denies having any trouble breathing.  He is still not able to lose his weight.  His initial blood pressure when he came in was slightly elevated but after a few minutes of talking to him and rechecking his blood pressure it came down.              Cardiac History  Past Surgical History:   Procedure Laterality Date   • CARDIAC CATHETERIZATION  2005    PCI of LAD & D1     • CARDIAC CATHETERIZATION  11/02/2006    75% LAD- 3.0 x 33 mm Cypher. 70% D1 2.5 x 18 mm Cypher Stent.     • CARDIOVASCULAR STRESS TEST  05/29/2007    10 Min 75% THR.Negative     • CARDIOVASCULAR STRESS TEST  05/18/2009    10  min, 98% tHR. Negative     • CARDIOVASCULAR STRESS TEST  07/12/2010    10 min. 83% THR. BP- 160/70. Anterior Ischemia.     • CARDIOVASCULAR STRESS TEST  09/08/2011    10 Min.85%THR. Negative     • CARDIOVASCULAR STRESS TEST  08/13/2013    10 Min,15 Secs. 89% THR. BP- 172/90. Negative.     • CARDIOVASCULAR STRESS TEST  04/13/2015    10 Min, 84% THR. BP- 184/78. Negative     • CARDIOVASCULAR STRESS TEST  02/18/2019    10 Min. 12.8 METS. 88% THR. BP- 172/80. EF 60%. Negative.   • CONVERTED (HISTORICAL) DUPLEX SCANS  04/13/2015    Carotid US- Normal.     • ECHO - CONVERTED  05/29/2007     EF 65%     • ECHO - CONVERTED  05/18/2009    EF >60%, Mild MR.     • ECHO - CONVERTED  07/12/2010    EF >60%, RVSP- 32.09mmHg.     • ECHO - CONVERTED  09/08/2011     EF 65%.     • ECHO - CONVERTED  08/13/2013    EF 65%. RVSp- 39 mmHg.     • ECHO - CONVERTED  04/13/2015    EF 65%     • ECHO - CONVERTED  02/18/2019    EF 60-65%. LA- 4.6 Cm. Mild MR. RVSP- 35 mmHg.       Current Outpatient Medications   Medication Sig Dispense Refill   • amLODIPine-benazepril (LOTREL) 10-20 MG per capsule Take 1 capsule by mouth Daily. 90 capsule 3   • aspirin 81 MG EC tablet Take 81 mg by mouth Daily.     • atorvastatin (LIPITOR) 20 MG tablet Take 1 tablet by mouth Daily. 90 tablet 3   • clopidogrel (PLAVIX) 75 MG tablet Take 1 tablet by mouth Daily. 90 tablet 3   • labetalol (NORMODYNE) 200 MG tablet Take 1 tablet by mouth Every 12 (Twelve) Hours. 180 tablet 3   • losartan-hydrochlorothiazide (HYZAAR) 100-25 MG per tablet Take 1 tablet by mouth Daily. 90 tablet 3   • montelukast (SINGULAIR) 10 MG tablet Take 1 tablet by mouth Every Night. 90 tablet 4     No current facility-administered medications for this visit.        Allergies  :  Patient has no known allergies.       Past Medical History:   Diagnosis Date   • H/O prostate biopsy     negative   • Hypercholesterolemia    • Hypertension    • IHD (ischemic heart disease)     S/P stenting of LAD &  "diagonal   • MR (mitral regurgitation)     mild   • pulse oximetry 07/07/2010    Overnight oximetry- Abnormal, sleep study ordered, pt. wants to lose wt. first.       Social History     Socioeconomic History   • Marital status:      Spouse name: Not on file   • Number of children: Not on file   • Years of education: Not on file   • Highest education level: Not on file   Tobacco Use   • Smoking status: Never Smoker   • Smokeless tobacco: Never Used   Substance and Sexual Activity   • Alcohol use: Yes     Comment: states he drinks beer or scotch every other day   • Drug use: No       Family History   Problem Relation Age of Onset   • Heart disease Mother    • Heart disease Father        Review of Systems   Constitution: Negative for decreased appetite and malaise/fatigue.   HENT: Negative for congestion and sore throat.    Eyes: Negative for blurred vision.   Cardiovascular: Negative for chest pain and orthopnea.   Respiratory: Negative for shortness of breath and snoring.    Endocrine: Negative for cold intolerance and heat intolerance.   Hematologic/Lymphatic: Negative for adenopathy. Does not bruise/bleed easily.   Skin: Positive for skin cancer. Negative for itching and nail changes.   Musculoskeletal: Negative for arthritis and myalgias.   Gastrointestinal: Negative for abdominal pain, dysphagia and heartburn.   Genitourinary: Negative for bladder incontinence and frequency.   Neurological: Negative for dizziness, light-headedness, seizures and vertigo.   Psychiatric/Behavioral: Negative for altered mental status.   Allergic/Immunologic: Negative for environmental allergies and hives.       Diabetes- No  Thyroid- normal    Objective     /82   Pulse 60   Ht 180.3 cm (71\")   Wt 103 kg (228 lb)   BMI 31.80 kg/m²     Physical Exam   Constitutional: He is oriented to person, place, and time. He appears well-developed and well-nourished.   HENT:   Head: Normocephalic.   Nose: Nose normal.   Eyes: " Pupils are equal, round, and reactive to light. EOM are normal.   Neck: Normal range of motion. Neck supple.   Cardiovascular: Normal rate, regular rhythm, S1 normal and S2 normal.   Murmur heard.  Pulmonary/Chest: Effort normal and breath sounds normal.   Abdominal: Soft. Bowel sounds are normal.   Musculoskeletal: Normal range of motion. He exhibits no edema.   Neurological: He is alert and oriented to person, place, and time.   Skin: Skin is warm and dry.   Psychiatric: He has a normal mood and affect.     Procedures            Assessment/Plan   Patient's Body mass index is 31.8 kg/m². BMI is above normal parameters. Recommendations include: nutrition counseling.     Edgardo was seen today for follow-up, med refill, melanoma and labs.    Diagnoses and all orders for this visit:    IHD (ischemic heart disease)  -     CBC & Differential; Future  -     clopidogrel (PLAVIX) 75 MG tablet; Take 1 tablet by mouth Daily.    Essential hypertension  -     Comprehensive Metabolic Panel; Future  -     amLODIPine-benazepril (LOTREL) 10-20 MG per capsule; Take 1 capsule by mouth Daily.  -     labetalol (NORMODYNE) 200 MG tablet; Take 1 tablet by mouth Every 12 (Twelve) Hours.  -     losartan-hydrochlorothiazide (HYZAAR) 100-25 MG per tablet; Take 1 tablet by mouth Daily.    Hypercholesteremia  -     Lipid Panel; Future  -     atorvastatin (LIPITOR) 20 MG tablet; Take 1 tablet by mouth Daily.    Mitral valve insufficiency, unspecified etiology    Murmur, cardiac    Sleep apnea in adult    Metabolic syndrome    Other chronic sinusitis  -     montelukast (SINGULAIR) 10 MG tablet; Take 1 tablet by mouth Every Night.       At baseline his heart rate and his blood pressure is normal.  His BMI is still around 32.  His cardiovascular examination is unremarkable.    Regarding his ischemic heart disease at this time continue his Plavix and aspirin.  I do not think he needs to undergo any other investigation at this time.    Regarding his  blood pressure, he is on combination of medication which includes an ACE inhibitors, beta-blockers, calcium channel blockers as well as an ARB along with a diuretic.  I talked to him about diet and weight reduction.  I was hoping to get rid of some of his blood pressure medication if he is able to lose some weight.    Regarding his hypercholesterolemia continue his Lipitor.  I like him to recheck his lipids along with a liver function tests and electrolytes.    Regarding his sleep apnea he is still able to tolerate it well.  Regarding his metabolic syndrome I do long talk with him about diet talk to him again about cutting down on the carbohydrate intake.    Overall cardiac status appears stable.  I will see him back in 6 months or sooner if needed.                  Electronically signed by Ovidio Warren MD February 11, 2020 2:56 PM

## 2020-02-20 ENCOUNTER — LAB (OUTPATIENT)
Dept: LAB | Facility: HOSPITAL | Age: 70
End: 2020-02-20

## 2020-02-20 DIAGNOSIS — I25.9 IHD (ISCHEMIC HEART DISEASE): ICD-10-CM

## 2020-02-20 DIAGNOSIS — E78.00 HYPERCHOLESTEREMIA: ICD-10-CM

## 2020-02-20 DIAGNOSIS — I10 ESSENTIAL HYPERTENSION: ICD-10-CM

## 2020-02-20 PROCEDURE — 80053 COMPREHEN METABOLIC PANEL: CPT | Performed by: INTERNAL MEDICINE

## 2020-02-20 PROCEDURE — 80061 LIPID PANEL: CPT | Performed by: INTERNAL MEDICINE

## 2020-02-20 PROCEDURE — 36415 COLL VENOUS BLD VENIPUNCTURE: CPT

## 2020-02-20 PROCEDURE — 85025 COMPLETE CBC W/AUTO DIFF WBC: CPT | Performed by: INTERNAL MEDICINE

## 2020-02-21 LAB
ALBUMIN SERPL-MCNC: 4.3 G/DL (ref 3.5–5.2)
ALBUMIN/GLOB SERPL: 1.7 G/DL
ALP SERPL-CCNC: 76 U/L (ref 39–117)
ALT SERPL W P-5'-P-CCNC: 17 U/L (ref 1–41)
ANION GAP SERPL CALCULATED.3IONS-SCNC: 15 MMOL/L (ref 5–15)
AST SERPL-CCNC: 22 U/L (ref 1–40)
BASOPHILS # BLD AUTO: 0.06 10*3/MM3 (ref 0–0.2)
BASOPHILS NFR BLD AUTO: 1.2 % (ref 0–1.5)
BILIRUB SERPL-MCNC: 0.5 MG/DL (ref 0.2–1.2)
BUN BLD-MCNC: 16 MG/DL (ref 8–23)
BUN/CREAT SERPL: 15.2 (ref 7–25)
CALCIUM SPEC-SCNC: 9.6 MG/DL (ref 8.6–10.5)
CHLORIDE SERPL-SCNC: 101 MMOL/L (ref 98–107)
CHOLEST SERPL-MCNC: 153 MG/DL (ref 0–200)
CO2 SERPL-SCNC: 27 MMOL/L (ref 22–29)
CREAT BLD-MCNC: 1.05 MG/DL (ref 0.76–1.27)
DEPRECATED RDW RBC AUTO: 48.4 FL (ref 37–54)
EOSINOPHIL # BLD AUTO: 0.39 10*3/MM3 (ref 0–0.4)
EOSINOPHIL NFR BLD AUTO: 7.5 % (ref 0.3–6.2)
ERYTHROCYTE [DISTWIDTH] IN BLOOD BY AUTOMATED COUNT: 13.8 % (ref 12.3–15.4)
GFR SERPL CREATININE-BSD FRML MDRD: 70 ML/MIN/1.73
GLOBULIN UR ELPH-MCNC: 2.5 GM/DL
GLUCOSE BLD-MCNC: 93 MG/DL (ref 65–99)
HCT VFR BLD AUTO: 42.2 % (ref 37.5–51)
HDLC SERPL-MCNC: 49 MG/DL (ref 40–60)
HGB BLD-MCNC: 13.1 G/DL (ref 13–17.7)
IMM GRANULOCYTES # BLD AUTO: 0.01 10*3/MM3 (ref 0–0.05)
IMM GRANULOCYTES NFR BLD AUTO: 0.2 % (ref 0–0.5)
LDLC SERPL CALC-MCNC: 87 MG/DL (ref 0–100)
LDLC/HDLC SERPL: 1.78 {RATIO}
LYMPHOCYTES # BLD AUTO: 1.22 10*3/MM3 (ref 0.7–3.1)
LYMPHOCYTES NFR BLD AUTO: 23.5 % (ref 19.6–45.3)
MCH RBC QN AUTO: 29.8 PG (ref 26.6–33)
MCHC RBC AUTO-ENTMCNC: 31 G/DL (ref 31.5–35.7)
MCV RBC AUTO: 96.1 FL (ref 79–97)
MONOCYTES # BLD AUTO: 0.61 10*3/MM3 (ref 0.1–0.9)
MONOCYTES NFR BLD AUTO: 11.8 % (ref 5–12)
NEUTROPHILS # BLD AUTO: 2.9 10*3/MM3 (ref 1.7–7)
NEUTROPHILS NFR BLD AUTO: 55.8 % (ref 42.7–76)
NRBC BLD AUTO-RTO: 0 /100 WBC (ref 0–0.2)
PLATELET # BLD AUTO: 279 10*3/MM3 (ref 140–450)
PMV BLD AUTO: 10 FL (ref 6–12)
POTASSIUM BLD-SCNC: 3.2 MMOL/L (ref 3.5–5.2)
PROT SERPL-MCNC: 6.8 G/DL (ref 6–8.5)
RBC # BLD AUTO: 4.39 10*6/MM3 (ref 4.14–5.8)
SODIUM BLD-SCNC: 143 MMOL/L (ref 136–145)
TRIGL SERPL-MCNC: 85 MG/DL (ref 0–150)
VLDLC SERPL-MCNC: 17 MG/DL
WBC NRBC COR # BLD: 5.19 10*3/MM3 (ref 3.4–10.8)

## 2020-02-24 NOTE — PROGRESS NOTES
Pt aware of results and recommendations to increase K+ rich foods, continue current medication regimen.

## 2020-05-11 ENCOUNTER — TELEPHONE (OUTPATIENT)
Dept: CARDIOLOGY | Facility: CLINIC | Age: 70
End: 2020-05-11

## 2020-05-11 NOTE — TELEPHONE ENCOUNTER
"Pt LM asking if according to his cardiac diagnosis  he would be considered \"high risk\" for the Covid-19 virus or just regular   "

## 2020-08-10 ENCOUNTER — OFFICE VISIT (OUTPATIENT)
Dept: CARDIOLOGY | Facility: CLINIC | Age: 70
End: 2020-08-10

## 2020-08-10 VITALS
WEIGHT: 225 LBS | TEMPERATURE: 98.6 F | HEIGHT: 71 IN | HEART RATE: 72 BPM | BODY MASS INDEX: 31.5 KG/M2 | SYSTOLIC BLOOD PRESSURE: 144 MMHG | DIASTOLIC BLOOD PRESSURE: 80 MMHG

## 2020-08-10 DIAGNOSIS — I25.9 IHD (ISCHEMIC HEART DISEASE): Primary | ICD-10-CM

## 2020-08-10 DIAGNOSIS — J32.8 OTHER CHRONIC SINUSITIS: ICD-10-CM

## 2020-08-10 DIAGNOSIS — E88.81 METABOLIC SYNDROME: ICD-10-CM

## 2020-08-10 DIAGNOSIS — G47.30 SLEEP APNEA IN ADULT: ICD-10-CM

## 2020-08-10 DIAGNOSIS — R09.89 BRUIT OF LEFT CAROTID ARTERY: ICD-10-CM

## 2020-08-10 DIAGNOSIS — E78.00 HYPERCHOLESTEREMIA: ICD-10-CM

## 2020-08-10 DIAGNOSIS — I10 ESSENTIAL HYPERTENSION: ICD-10-CM

## 2020-08-10 PROCEDURE — 99214 OFFICE O/P EST MOD 30 MIN: CPT | Performed by: INTERNAL MEDICINE

## 2020-08-10 RX ORDER — LABETALOL 200 MG/1
200 TABLET, FILM COATED ORAL EVERY 12 HOURS SCHEDULED
Qty: 180 TABLET | Refills: 3 | Status: SHIPPED | OUTPATIENT
Start: 2020-08-10 | End: 2020-10-12 | Stop reason: SDUPTHER

## 2020-08-10 RX ORDER — MONTELUKAST SODIUM 10 MG/1
10 TABLET ORAL NIGHTLY PRN
Qty: 90 TABLET | Refills: 3 | Status: SHIPPED | OUTPATIENT
Start: 2020-08-10 | End: 2021-03-29

## 2020-08-10 RX ORDER — ATORVASTATIN CALCIUM 20 MG/1
20 TABLET, FILM COATED ORAL DAILY
Qty: 90 TABLET | Refills: 3 | Status: SHIPPED | OUTPATIENT
Start: 2020-08-10 | End: 2020-10-12 | Stop reason: SDUPTHER

## 2020-08-10 RX ORDER — AMLODIPINE BESYLATE AND BENAZEPRIL HYDROCHLORIDE 10; 20 MG/1; MG/1
1 CAPSULE ORAL DAILY
Qty: 90 CAPSULE | Refills: 3 | Status: SHIPPED | OUTPATIENT
Start: 2020-08-10 | End: 2020-10-12 | Stop reason: SDUPTHER

## 2020-08-10 RX ORDER — CLOPIDOGREL BISULFATE 75 MG/1
75 TABLET ORAL DAILY
Qty: 90 TABLET | Refills: 3 | Status: SHIPPED | OUTPATIENT
Start: 2020-08-10 | End: 2020-10-12 | Stop reason: SDUPTHER

## 2020-08-10 RX ORDER — LOSARTAN POTASSIUM AND HYDROCHLOROTHIAZIDE 25; 100 MG/1; MG/1
1 TABLET ORAL DAILY
Qty: 90 TABLET | Refills: 3 | Status: SHIPPED | OUTPATIENT
Start: 2020-08-10 | End: 2020-10-12 | Stop reason: SDUPTHER

## 2020-08-10 NOTE — PROGRESS NOTES
Chief Complaint   Patient presents with   • Follow-up     for cardiac management, doing well   • Med Refill     refills needed on cardiac meds, 90 days to [facility].    • Labs     most recent labs in chart from Feb       CARDIAC COMPLAINTS  Cardiac Management        Subjective   Edgardo Ortez is a 70 y.o. male came in today for his regular follow-up visit.  He has history of ischemic heart disease diagnosed in the past when he underwent stenting.  His last cardiac work-up which was in 2019 showed no evidence of ischemia.  He came today with no new symptoms.  He initially has been losing weight steadily but in the last few months he has not done any activities and also started eating a lot of sweets.  He denies having any chest pain or shortness of breath.  He denies having any palpitation.  He denies having any dizziness.  His lab work which was done in February showed cholesterol of 153 with the LDL of 87 HDL of 49 and triglyceride 85.              Cardiac History  Past Surgical History:   Procedure Laterality Date   • CARDIAC CATHETERIZATION  2005    PCI of LAD & D1     • CARDIAC CATHETERIZATION  11/02/2006    75% LAD- 3.0 x 33 mm Cypher. 70% D1 2.5 x 18 mm Cypher Stent.     • CARDIOVASCULAR STRESS TEST  05/29/2007    10 Min 75% THR.Negative     • CARDIOVASCULAR STRESS TEST  05/18/2009    10 min, 98% tHR. Negative     • CARDIOVASCULAR STRESS TEST  07/12/2010    10 min. 83% THR. BP- 160/70. Anterior Ischemia.     • CARDIOVASCULAR STRESS TEST  09/08/2011    10 Min.85%THR. Negative     • CARDIOVASCULAR STRESS TEST  08/13/2013    10 Min,15 Secs. 89% THR. BP- 172/90. Negative.     • CARDIOVASCULAR STRESS TEST  04/13/2015    10 Min, 84% THR. BP- 184/78. Negative     • CARDIOVASCULAR STRESS TEST  02/18/2019    10 Min. 12.8 METS. 88% THR. BP- 172/80. EF 60%. Negative.   • CONVERTED (HISTORICAL) DUPLEX SCANS  04/13/2015    Carotid US- Normal.     • ECHO - CONVERTED  05/29/2007     EF 65%     • ECHO - CONVERTED   05/18/2009    EF >60%, Mild MR.     • ECHO - CONVERTED  07/12/2010    EF >60%, RVSP- 32.09mmHg.     • ECHO - CONVERTED  09/08/2011     EF 65%.     • ECHO - CONVERTED  08/13/2013    EF 65%. RVSp- 39 mmHg.     • ECHO - CONVERTED  04/13/2015    EF 65%     • ECHO - CONVERTED  02/18/2019    EF 60-65%. LA- 4.6 Cm. Mild MR. RVSP- 35 mmHg.       Current Outpatient Medications   Medication Sig Dispense Refill   • amLODIPine-benazepril (LOTREL) 10-20 MG per capsule Take 1 capsule by mouth Daily. 90 capsule 3   • aspirin 81 MG EC tablet Take 81 mg by mouth Daily.     • atorvastatin (LIPITOR) 20 MG tablet Take 1 tablet by mouth Daily. 90 tablet 3   • clopidogrel (PLAVIX) 75 MG tablet Take 1 tablet by mouth Daily. 90 tablet 3   • labetalol (NORMODYNE) 200 MG tablet Take 1 tablet by mouth Every 12 (Twelve) Hours. 180 tablet 3   • losartan-hydrochlorothiazide (HYZAAR) 100-25 MG per tablet Take 1 tablet by mouth Daily. 90 tablet 3   • montelukast (Singulair) 10 MG tablet Take 1 tablet by mouth At Night As Needed (Congested). 90 tablet 3     No current facility-administered medications for this visit.        Allergies  :  Patient has no known allergies.       Past Medical History:   Diagnosis Date   • H/O prostate biopsy     negative   • Hypercholesterolemia    • Hypertension    • IHD (ischemic heart disease)     S/P stenting of LAD & diagonal   • MR (mitral regurgitation)     mild   • pulse oximetry 07/07/2010    Overnight oximetry- Abnormal, sleep study ordered, pt. wants to lose wt. first.       Social History     Socioeconomic History   • Marital status:      Spouse name: Not on file   • Number of children: Not on file   • Years of education: Not on file   • Highest education level: Not on file   Tobacco Use   • Smoking status: Never Smoker   • Smokeless tobacco: Never Used   Substance and Sexual Activity   • Alcohol use: Yes     Comment: states he drinks beer or scotch every other day   • Drug use: No       Family  "History   Problem Relation Age of Onset   • Heart disease Mother    • Heart disease Father        Review of Systems   Constitution: Negative for decreased appetite and malaise/fatigue.   HENT: Negative for congestion and sore throat.    Eyes: Negative for blurred vision.   Cardiovascular: Negative for chest pain and palpitations.   Respiratory: Negative for shortness of breath and snoring.    Endocrine: Negative for cold intolerance and heat intolerance.   Hematologic/Lymphatic: Negative for adenopathy. Does not bruise/bleed easily.   Skin: Negative for itching, nail changes and skin cancer.   Musculoskeletal: Negative for arthritis and myalgias.   Gastrointestinal: Negative for abdominal pain, dysphagia and heartburn.   Genitourinary: Negative for bladder incontinence and frequency.   Neurological: Negative for dizziness, light-headedness, seizures and vertigo.   Psychiatric/Behavioral: Negative for altered mental status.   Allergic/Immunologic: Negative for environmental allergies and hives.       Diabetes- No  Thyroid- normal    Objective     /80   Pulse 72   Temp 98.6 °F (37 °C)   Ht 180.3 cm (71\")   Wt 102 kg (225 lb)   BMI 31.38 kg/m²     Physical Exam   Constitutional: He is oriented to person, place, and time. He appears well-nourished.   HENT:   Head: Normocephalic.   Eyes: Pupils are equal, round, and reactive to light.   Neck: Normal range of motion. Carotid bruit is present.   Cardiovascular: Normal rate, regular rhythm, S1 normal and S2 normal.   Murmur heard.  Pulmonary/Chest: Effort normal and breath sounds normal.   Abdominal: Soft. Bowel sounds are normal.   Musculoskeletal: Normal range of motion. He exhibits edema.   Neurological: He is alert and oriented to person, place, and time.   Skin: Skin is warm and dry.   Psychiatric: He has a normal mood and affect.     Procedures            Assessment/Plan   Patient's Body mass index is 31.38 kg/m². BMI is above normal parameters. " Recommendations include: nutrition counseling.     Edgardo was seen today for follow-up, med refill and labs.    Diagnoses and all orders for this visit:    IHD (ischemic heart disease)  -     CBC & Differential; Future  -     clopidogrel (PLAVIX) 75 MG tablet; Take 1 tablet by mouth Daily.    Essential hypertension  -     Comprehensive Metabolic Panel; Future  -     amLODIPine-benazepril (LOTREL) 10-20 MG per capsule; Take 1 capsule by mouth Daily.  -     losartan-hydrochlorothiazide (HYZAAR) 100-25 MG per tablet; Take 1 tablet by mouth Daily.  -     labetalol (NORMODYNE) 200 MG tablet; Take 1 tablet by mouth Every 12 (Twelve) Hours.    Hypercholesteremia  -     Lipid Panel; Future  -     atorvastatin (LIPITOR) 20 MG tablet; Take 1 tablet by mouth Daily.    Metabolic syndrome    Sleep apnea in adult    Bruit of left carotid artery  -     US Carotid Bilateral; Future    Other chronic sinusitis  -     montelukast (Singulair) 10 MG tablet; Take 1 tablet by mouth At Night As Needed (Congested).       His heart rate is stable.  His blood pressure has gone up a little bit.  His BMI is around 31.  He is clinical examination is unremarkable other than there is a bruit I heard in the left carotid area.  He does have a soft systolic murmur at the mitral area.    Regarding his ischemic heart disease, at this time continue his Plavix and aspirin as before.  He has not had any anginal symptoms.  He is advised to check his labs especially the hemoglobin    Regarding his blood pressure, he is on multiple medication which includes calcium channel blockers, ACE inhibitors,ARB, beta-blockers and diuretic.. He is advised on the low-sodium diet and also advised him to check his electrolytes and renal function    Regarding his hypercholesterolemia, continue the Lipitor.  He has no myalgia.  Check his lipid panel as well as the liver function test    Regarding the metabolic syndrome and sleep apnea, talked to him in detail again about  the low-carb diet.  He is using the CPAP without any problem.  At this time continue the same    Regarding the bruit in the left carotid, I scheduled him to undergo a carotid ultrasound to evaluate the stenosis                  Electronically signed by Ovidio Warren MD August 10, 2020 16:21

## 2020-08-13 ENCOUNTER — HOSPITAL ENCOUNTER (OUTPATIENT)
Dept: CARDIOLOGY | Facility: HOSPITAL | Age: 70
Discharge: HOME | End: 2020-08-13
Admitting: INTERNAL MEDICINE

## 2020-08-13 ENCOUNTER — LAB (OUTPATIENT)
Dept: LAB | Facility: HOSPITAL | Age: 70
End: 2020-08-13

## 2020-08-13 DIAGNOSIS — E78.00 HYPERCHOLESTEREMIA: ICD-10-CM

## 2020-08-13 DIAGNOSIS — R09.89 BRUIT OF LEFT CAROTID ARTERY: ICD-10-CM

## 2020-08-13 DIAGNOSIS — I10 ESSENTIAL HYPERTENSION: ICD-10-CM

## 2020-08-13 DIAGNOSIS — I25.9 IHD (ISCHEMIC HEART DISEASE): ICD-10-CM

## 2020-08-13 PROCEDURE — 93880 EXTRACRANIAL BILAT STUDY: CPT | Performed by: RADIOLOGY

## 2020-08-13 PROCEDURE — 93880 EXTRACRANIAL BILAT STUDY: CPT

## 2020-08-13 PROCEDURE — 36415 COLL VENOUS BLD VENIPUNCTURE: CPT

## 2020-08-14 LAB
ALBUMIN SERPL-MCNC: 4.59 G/DL (ref 3.5–5.2)
ALBUMIN/GLOB SERPL: 2.1 G/DL
ALP SERPL-CCNC: 85 U/L (ref 39–117)
ALT SERPL W P-5'-P-CCNC: 19 U/L (ref 1–41)
ANION GAP SERPL CALCULATED.3IONS-SCNC: 10.9 MMOL/L (ref 5–15)
AST SERPL-CCNC: 23 U/L (ref 1–40)
BASOPHILS # BLD AUTO: 0.05 10*3/MM3 (ref 0–0.2)
BASOPHILS NFR BLD AUTO: 0.9 % (ref 0–1.5)
BILIRUB SERPL-MCNC: 0.7 MG/DL (ref 0–1.2)
BUN SERPL-MCNC: 15 MG/DL (ref 8–23)
BUN/CREAT SERPL: 15.2 (ref 7–25)
CALCIUM SPEC-SCNC: 9.6 MG/DL (ref 8.6–10.5)
CHLORIDE SERPL-SCNC: 103 MMOL/L (ref 98–107)
CHOLEST SERPL-MCNC: 165 MG/DL (ref 0–200)
CO2 SERPL-SCNC: 28.1 MMOL/L (ref 22–29)
CREAT SERPL-MCNC: 0.99 MG/DL (ref 0.76–1.27)
DEPRECATED RDW RBC AUTO: 49.2 FL (ref 37–54)
EOSINOPHIL # BLD AUTO: 0.34 10*3/MM3 (ref 0–0.4)
EOSINOPHIL NFR BLD AUTO: 6.2 % (ref 0.3–6.2)
ERYTHROCYTE [DISTWIDTH] IN BLOOD BY AUTOMATED COUNT: 14.2 % (ref 12.3–15.4)
GFR SERPL CREATININE-BSD FRML MDRD: 75 ML/MIN/1.73
GLOBULIN UR ELPH-MCNC: 2.2 GM/DL
GLUCOSE SERPL-MCNC: 95 MG/DL (ref 65–99)
HCT VFR BLD AUTO: 44.5 % (ref 37.5–51)
HDLC SERPL-MCNC: 50 MG/DL (ref 40–60)
HGB BLD-MCNC: 13.5 G/DL (ref 13–17.7)
IMM GRANULOCYTES # BLD AUTO: 0.02 10*3/MM3 (ref 0–0.05)
IMM GRANULOCYTES NFR BLD AUTO: 0.4 % (ref 0–0.5)
LDLC SERPL CALC-MCNC: 96 MG/DL (ref 0–100)
LDLC/HDLC SERPL: 1.91 {RATIO}
LYMPHOCYTES # BLD AUTO: 1.12 10*3/MM3 (ref 0.7–3.1)
LYMPHOCYTES NFR BLD AUTO: 20.6 % (ref 19.6–45.3)
MCH RBC QN AUTO: 29.2 PG (ref 26.6–33)
MCHC RBC AUTO-ENTMCNC: 30.3 G/DL (ref 31.5–35.7)
MCV RBC AUTO: 96.3 FL (ref 79–97)
MONOCYTES # BLD AUTO: 0.61 10*3/MM3 (ref 0.1–0.9)
MONOCYTES NFR BLD AUTO: 11.2 % (ref 5–12)
NEUTROPHILS NFR BLD AUTO: 3.31 10*3/MM3 (ref 1.7–7)
NEUTROPHILS NFR BLD AUTO: 60.7 % (ref 42.7–76)
NRBC BLD AUTO-RTO: 0 /100 WBC (ref 0–0.2)
PLATELET # BLD AUTO: 258 10*3/MM3 (ref 140–450)
PMV BLD AUTO: 10 FL (ref 6–12)
POTASSIUM SERPL-SCNC: 4.3 MMOL/L (ref 3.5–5.2)
PROT SERPL-MCNC: 6.8 G/DL (ref 6–8.5)
RBC # BLD AUTO: 4.62 10*6/MM3 (ref 4.14–5.8)
SODIUM SERPL-SCNC: 142 MMOL/L (ref 136–145)
TRIGL SERPL-MCNC: 97 MG/DL (ref 0–150)
VLDLC SERPL-MCNC: 19.4 MG/DL
WBC # BLD AUTO: 5.45 10*3/MM3 (ref 3.4–10.8)

## 2020-08-14 PROCEDURE — 80061 LIPID PANEL: CPT | Performed by: INTERNAL MEDICINE

## 2020-08-14 PROCEDURE — 80053 COMPREHEN METABOLIC PANEL: CPT | Performed by: INTERNAL MEDICINE

## 2020-08-14 PROCEDURE — 85025 COMPLETE CBC W/AUTO DIFF WBC: CPT | Performed by: INTERNAL MEDICINE

## 2020-09-23 ENCOUNTER — TELEPHONE (OUTPATIENT)
Dept: CARDIOLOGY | Facility: CLINIC | Age: 70
End: 2020-09-23

## 2020-09-23 NOTE — TELEPHONE ENCOUNTER
EXAM note      Assessment & Plan      1. Acute pain of both knees      She was given a work excuse for yesterday and today.  She is scheduled with Orthopedics for tomorrow.  Recommend she continue resting icing and using Ace wraps as well as elevating until then.  Instructed to take 3-4 ibuprofen q.8 hours alternating with Tylenol 2 tablets every 8 hours for pain control.    Return if symptoms worsen or fail to improve.          History  Precious Russell is a 50 year old female who presents with complaints of bilateral knee pain.  Comments she was seen in urgent care on 11/07/2019 stating that the night before she was walking down steps and both knees buckled.  Denies falling as a friend caught her.  States she landed on the left knee but notes worse pain in her right knee as well as swelling.  She has been using crutches since then.  She had been given a work excuse through 11/10/2019 however she is scheduled to see Orthopedics tomorrow and needs this extended.  Comments she has been taking 4 ibuprofen every 4 hours for pain without relief.  She had taken 1 of her friends Tylenol with codeine which she found helpful.  She describes her right knee pain as a pulling sensation along the medial aspect.  Denies any distal tingling or numbness.      Past Medical History:   Diagnosis Date   • Hypertension      ALLERGIES:  No Known Allergies  Current Outpatient Medications   Medication Sig Dispense Refill   • dilTIAZem (DILTIAZEM CD) 120 MG 24 hr capsule Take 1 capsule by mouth daily. 30 capsule 5   • Multiple Vitamins-Minerals (CENTRUM ADULTS PO)      • Iron-Vitamins (GERITOL COMPLETE PO)      • ibuprofen (MOTRIN) 400 MG tablet Take 1 tablet by mouth every 6 hours as needed for Pain. 30 tablet 0     No current facility-administered medications for this visit.      History reviewed. No pertinent surgical history.  Social History     Tobacco Use   • Smoking status: Current Every Day Smoker   • Smokeless tobacco:  Pt called and LM that he was diagnosed with COVID and is currently quarantined. Reports a mild cough with temp no higher than 99.0, otherwise no symptoms. Would have never even realized he had it other than a reported  exposure and had to be tested. Asking if there is anything in particular he needs to do?   Never Used   • Tobacco comment: does not smoke every day, smokes about once a week    Substance Use Topics   • Alcohol use: Yes   • Drug use: No     Family History   Problem Relation Age of Onset   • Hypertension Mother    • Diabetes Mother    • Hypertension Sister    • Stroke Brother          Health Maintenance   Topic Date Due   • Pneumococcal Vaccine 0-64 (1 of 1 - PPSV23) 04/03/1975   • Depression Screening  04/03/1981   • DTaP/Tdap/Td Vaccine (1 - Tdap) 04/03/1988   • Cervical Cancer Screening HPV CO-Testing  04/03/1999   • Breast Cancer Screening  04/03/2014   • Colorectal Cancer Screening-Colonoscopy  04/03/2019   • Shingles Vaccine (1 of 2) 04/03/2019   • Influenza Vaccine (1) 09/01/2019   • Meningococcal Vaccine  Aged Out           Physical Exam  Vital Signs:    Vitals:    11/12/19 1101   BP: 130/78   Pulse: 80   Weight: 83.9 kg   Height: 5' 1\" (1.549 m)     Body mass index is 34.96 kg/m².    Constitutional:  Well groomed. In no acute distress. Eye contact and conversation appropriate for age.  Integument:  Warm and dry. No erythema or edema noted.  HENT:  Normocephalic. Atraumatic.   Neck:  Trachea midline.  Eyes: PERRL (pupils equal, round, and reactive to light), EOMI (extraocular movements are intact). Conjunctivae normal. No discharge. No nystagmus.   Respiratory:  Unlabored  Extremities:  Right knee noted to have ecchymoses along the medial aspect of her knee which is tender.  No patellar dislocation noted.  Minimal effusion noted.  Left knee ecchymoses noted medial inferior region of her knee.  No patellar dislocation or effusion noted.  Minimal tenderness noted medially.  She has full active range of motion of both extremities although somewhat guarded due to discomfort.  Both knees had been wrapped with Ace wraps.  Neurological:  Cranial nerves II through XII grossly intact. Gait with assist of crutches.

## 2020-10-12 DIAGNOSIS — I25.9 IHD (ISCHEMIC HEART DISEASE): ICD-10-CM

## 2020-10-12 DIAGNOSIS — E78.00 HYPERCHOLESTEREMIA: ICD-10-CM

## 2020-10-12 DIAGNOSIS — I10 ESSENTIAL HYPERTENSION: ICD-10-CM

## 2020-10-12 RX ORDER — LABETALOL 200 MG/1
200 TABLET, FILM COATED ORAL EVERY 12 HOURS SCHEDULED
Qty: 180 TABLET | Refills: 3 | Status: SHIPPED | OUTPATIENT
Start: 2020-10-12 | End: 2021-03-29 | Stop reason: SDUPTHER

## 2020-10-12 RX ORDER — AMLODIPINE BESYLATE AND BENAZEPRIL HYDROCHLORIDE 10; 20 MG/1; MG/1
1 CAPSULE ORAL DAILY
Qty: 90 CAPSULE | Refills: 3 | Status: SHIPPED | OUTPATIENT
Start: 2020-10-12 | End: 2021-03-29 | Stop reason: SDUPTHER

## 2020-10-12 RX ORDER — LOSARTAN POTASSIUM AND HYDROCHLOROTHIAZIDE 25; 100 MG/1; MG/1
1 TABLET ORAL DAILY
Qty: 90 TABLET | Refills: 3 | Status: SHIPPED | OUTPATIENT
Start: 2020-10-12 | End: 2021-03-29 | Stop reason: SDUPTHER

## 2020-10-12 RX ORDER — ATORVASTATIN CALCIUM 20 MG/1
20 TABLET, FILM COATED ORAL DAILY
Qty: 90 TABLET | Refills: 3 | Status: SHIPPED | OUTPATIENT
Start: 2020-10-12 | End: 2021-03-29 | Stop reason: SDUPTHER

## 2020-10-12 RX ORDER — CLOPIDOGREL BISULFATE 75 MG/1
75 TABLET ORAL DAILY
Qty: 90 TABLET | Refills: 3 | Status: SHIPPED | OUTPATIENT
Start: 2020-10-12 | End: 2021-03-29 | Stop reason: SDUPTHER

## 2020-10-12 NOTE — TELEPHONE ENCOUNTER
Patient has called in requesting his medication be sent to [facility]  Mail delivery pharmacy.

## 2021-03-29 ENCOUNTER — OFFICE VISIT (OUTPATIENT)
Dept: CARDIOLOGY | Facility: CLINIC | Age: 71
End: 2021-03-29

## 2021-03-29 VITALS
HEART RATE: 60 BPM | TEMPERATURE: 98.4 F | DIASTOLIC BLOOD PRESSURE: 90 MMHG | BODY MASS INDEX: 31.92 KG/M2 | SYSTOLIC BLOOD PRESSURE: 160 MMHG | HEIGHT: 71 IN | WEIGHT: 228 LBS

## 2021-03-29 DIAGNOSIS — E78.00 HYPERCHOLESTEREMIA: ICD-10-CM

## 2021-03-29 DIAGNOSIS — I10 ESSENTIAL HYPERTENSION: ICD-10-CM

## 2021-03-29 DIAGNOSIS — E55.9 VITAMIN D DEFICIENCY: ICD-10-CM

## 2021-03-29 DIAGNOSIS — I25.9 IHD (ISCHEMIC HEART DISEASE): Primary | ICD-10-CM

## 2021-03-29 DIAGNOSIS — E88.81 METABOLIC SYNDROME: ICD-10-CM

## 2021-03-29 DIAGNOSIS — R73.9 HYPERGLYCEMIA: ICD-10-CM

## 2021-03-29 DIAGNOSIS — R20.0 NUMBNESS: ICD-10-CM

## 2021-03-29 DIAGNOSIS — G47.30 SLEEP APNEA IN ADULT: ICD-10-CM

## 2021-03-29 DIAGNOSIS — C43.9 MALIGNANT MELANOMA OF SKIN (HCC): ICD-10-CM

## 2021-03-29 PROCEDURE — 99214 OFFICE O/P EST MOD 30 MIN: CPT | Performed by: INTERNAL MEDICINE

## 2021-03-29 RX ORDER — CLOPIDOGREL BISULFATE 75 MG/1
75 TABLET ORAL DAILY
Qty: 90 TABLET | Refills: 3 | Status: SHIPPED | OUTPATIENT
Start: 2021-03-29 | End: 2021-11-15 | Stop reason: SDUPTHER

## 2021-03-29 RX ORDER — LOSARTAN POTASSIUM AND HYDROCHLOROTHIAZIDE 25; 100 MG/1; MG/1
1 TABLET ORAL DAILY
Qty: 90 TABLET | Refills: 3 | Status: SHIPPED | OUTPATIENT
Start: 2021-03-29 | End: 2021-11-15 | Stop reason: SDUPTHER

## 2021-03-29 RX ORDER — AMLODIPINE BESYLATE AND BENAZEPRIL HYDROCHLORIDE 10; 20 MG/1; MG/1
1 CAPSULE ORAL DAILY
Qty: 90 CAPSULE | Refills: 3 | Status: SHIPPED | OUTPATIENT
Start: 2021-03-29 | End: 2021-11-15 | Stop reason: SDUPTHER

## 2021-03-29 RX ORDER — LABETALOL 200 MG/1
200 TABLET, FILM COATED ORAL EVERY 12 HOURS SCHEDULED
Qty: 180 TABLET | Refills: 3 | Status: SHIPPED | OUTPATIENT
Start: 2021-03-29 | End: 2021-11-15 | Stop reason: SDUPTHER

## 2021-03-29 RX ORDER — ATORVASTATIN CALCIUM 20 MG/1
20 TABLET, FILM COATED ORAL DAILY
Qty: 90 TABLET | Refills: 3 | Status: SHIPPED | OUTPATIENT
Start: 2021-03-29 | End: 2021-11-15 | Stop reason: SDUPTHER

## 2021-03-29 RX ORDER — TADALAFIL 5 MG/1
5 TABLET ORAL DAILY
COMMUNITY
End: 2022-07-07 | Stop reason: SDUPTHER

## 2021-03-29 NOTE — PROGRESS NOTES
Chief Complaint   Patient presents with   • Follow-up     for cardiac management, reports doing well   • Med Refill     refills needed on cardiac meds, 90 days to [facility]   • Covid     diagnosed in Sept/Oct, mild symptoms, has taken both vaccines.    • Labs     most recent labs from Aug are in the chart.    • Hypertension     BP elevated today, reports a high sodium diet yesterday.        CARDIAC COMPLAINTS  Numbness & Cardiac Management        Subjective   Edgardo Ortez is a 70 y.o. male came in today for his regular follow-up visit.  He has history of ischemic heart disease where he underwent PCI in 2005 and in 2006.  His last cardiac work-up which was done in 2019 showed no evidence of ischemia and his LV function was normal.  He came today with no new cardiac symptoms.  He did develop COVID-19 infection after being tested secondary to exposure.  He had mild fever for about few days and then subsided.  His last lab work which was done in August showed normal electrolytes, normal liver function, cholesterol 165 with the LDL of 96 and normal blood count.  His carotid ultrasound did not show any significant stenosis.  He does complain of having numbness involving his left leg just around the toes.  It occurs more in the night when he sleeping.  He feels it is getting little cold and then it gets better he denies having any pain in the foot.  His blood pressure is slightly elevated today secondary to high sodium diet yesterday when his grandson was visiting.              Cardiac History  Past Surgical History:   Procedure Laterality Date   • CARDIAC CATHETERIZATION  2005    PCI of LAD & D1     • CARDIAC CATHETERIZATION  11/02/2006    75% LAD- 3.0 x 33 mm Cypher. 70% D1 2.5 x 18 mm Cypher Stent.     • CARDIOVASCULAR STRESS TEST  05/29/2007    10 Min 75% THR.Negative     • CARDIOVASCULAR STRESS TEST  05/18/2009    10 min, 98% tHR. Negative     • CARDIOVASCULAR STRESS TEST  07/12/2010    10 min. 83% THR. BP- 160/70.  Anterior Ischemia.     • CARDIOVASCULAR STRESS TEST  09/08/2011    10 Min.85%THR. Negative     • CARDIOVASCULAR STRESS TEST  08/13/2013    10 Min,15 Secs. 89% THR. BP- 172/90. Negative.     • CARDIOVASCULAR STRESS TEST  04/13/2015    10 Min, 84% THR. BP- 184/78. Negative     • CARDIOVASCULAR STRESS TEST  02/18/2019    10 Min. 12.8 METS. 88% THR. BP- 172/80. EF 60%. Negative.   • CONVERTED (HISTORICAL) DUPLEX SCANS  04/13/2015    Carotid US- Normal.     • ECHO - CONVERTED  05/29/2007     EF 65%     • ECHO - CONVERTED  05/18/2009    EF >60%, Mild MR.     • ECHO - CONVERTED  07/12/2010    EF >60%, RVSP- 32.09mmHg.     • ECHO - CONVERTED  09/08/2011     EF 65%.     • ECHO - CONVERTED  08/13/2013    EF 65%. RVSp- 39 mmHg.     • ECHO - CONVERTED  04/13/2015    EF 65%     • ECHO - CONVERTED  02/18/2019    EF 60-65%. LA- 4.6 Cm. Mild MR. RVSP- 35 mmHg.   • US CAROTID UNILATERAL  08/13/2020    No sig stenosis       Current Outpatient Medications   Medication Sig Dispense Refill   • amLODIPine-benazepril (LOTREL) 10-20 MG per capsule Take 1 capsule by mouth Daily. 90 capsule 3   • aspirin 81 MG EC tablet Take 81 mg by mouth Daily.     • atorvastatin (LIPITOR) 20 MG tablet Take 1 tablet by mouth Daily. 90 tablet 3   • clopidogrel (PLAVIX) 75 MG tablet Take 1 tablet by mouth Daily. 90 tablet 3   • labetalol (NORMODYNE) 200 MG tablet Take 1 tablet by mouth Every 12 (Twelve) Hours. 180 tablet 3   • losartan-hydrochlorothiazide (HYZAAR) 100-25 MG per tablet Take 1 tablet by mouth Daily. 90 tablet 3   • tadalafil (CIALIS) 5 MG tablet Take 5 mg by mouth Daily As Needed for Erectile Dysfunction.       No current facility-administered medications for this visit.       Allergies  :  Patient has no known allergies.       Past Medical History:   Diagnosis Date   • H/O prostate biopsy     negative   • Hypercholesterolemia    • Hypertension    • IHD (ischemic heart disease)     S/P stenting of LAD & diagonal   • MR (mitral regurgitation)   "   mild   • pulse oximetry 07/07/2010    Overnight oximetry- Abnormal, sleep study ordered, pt. wants to lose wt. first.       Social History     Socioeconomic History   • Marital status:      Spouse name: Not on file   • Number of children: Not on file   • Years of education: Not on file   • Highest education level: Not on file   Tobacco Use   • Smoking status: Never Smoker   • Smokeless tobacco: Never Used   Substance and Sexual Activity   • Alcohol use: Yes     Comment: states he drinks beer or scotch every other day   • Drug use: No       Family History   Problem Relation Age of Onset   • Heart disease Mother    • Heart disease Father        Review of Systems   Constitutional: Negative for decreased appetite and malaise/fatigue.   HENT: Negative for congestion and sore throat.    Eyes: Negative for blurred vision.   Cardiovascular: Negative for chest pain and near-syncope.   Respiratory: Negative for shortness of breath and snoring.    Endocrine: Negative for cold intolerance and heat intolerance.   Hematologic/Lymphatic: Negative for adenopathy. Does not bruise/bleed easily.   Skin: Negative for itching, nail changes and skin cancer.   Musculoskeletal: Negative for arthritis and myalgias.   Gastrointestinal: Negative for abdominal pain, dysphagia and heartburn.   Genitourinary: Negative for bladder incontinence and frequency.   Neurological: Positive for numbness. Negative for dizziness, light-headedness, seizures and vertigo.   Psychiatric/Behavioral: Negative for altered mental status.   Allergic/Immunologic: Negative for environmental allergies and hives.       Diabetes- No  Thyroid- normal    Objective     /90   Pulse 60   Temp 98.4 °F (36.9 °C)   Ht 180.3 cm (71\")   Wt 103 kg (228 lb)   BMI 31.80 kg/m²     Vitals and nursing note reviewed.   Constitutional:       Appearance: Healthy appearance. Not in distress.   Eyes:      Conjunctiva/sclera: Conjunctivae normal.      Pupils: Pupils are " equal, round, and reactive to light.   HENT:      Head: Normocephalic.   Pulmonary:      Effort: Increased respiratory effort.      Breath sounds: Normal breath sounds.   Cardiovascular:      PMI at left midclavicular line. Normal rate. Regular rhythm.   Abdominal:      General: Bowel sounds are normal.      Palpations: Abdomen is soft.   Musculoskeletal: Normal range of motion.      Cervical back: Normal range of motion and neck supple. Skin:     General: Skin is warm and dry.   Neurological:      Mental Status: Alert, oriented to person, place, and time and oriented to person, place and time.       Procedures            Assessment/Plan   Patient's Body mass index is 31.8 kg/m². BMI is above normal parameters. Recommendations include: exercise counseling and nutrition counseling.     Diagnoses and all orders for this visit:    1. IHD (ischemic heart disease) (Primary)  -     clopidogrel (PLAVIX) 75 MG tablet; Take 1 tablet by mouth Daily.  Dispense: 90 tablet; Refill: 3  -     CBC & Differential; Future    2. Essential hypertension  -     amLODIPine-benazepril (LOTREL) 10-20 MG per capsule; Take 1 capsule by mouth Daily.  Dispense: 90 capsule; Refill: 3  -     losartan-hydrochlorothiazide (HYZAAR) 100-25 MG per tablet; Take 1 tablet by mouth Daily.  Dispense: 90 tablet; Refill: 3  -     labetalol (NORMODYNE) 200 MG tablet; Take 1 tablet by mouth Every 12 (Twelve) Hours.  Dispense: 180 tablet; Refill: 3  -     Comprehensive Metabolic Panel; Future    3. Hypercholesteremia  -     atorvastatin (LIPITOR) 20 MG tablet; Take 1 tablet by mouth Daily.  Dispense: 90 tablet; Refill: 3  -     Lipid Panel; Future    4. Metabolic syndrome  -     TSH; Future    5. Sleep apnea in adult    6. Malignant melanoma of skin (CMS/HCC)    7. Numbness  -     Sedimentation Rate; Future  -     Vitamin B12; Future  -     Folate; Future  -     TSH; Future    8. Hyperglycemia  -     Hemoglobin A1c; Future    9. Vitamin D deficiency  -      Vitamin D 25 Hydroxy; Future    At baseline his heart rate is stable.  His blood pressure is slightly elevated.  His BMI is still at 32.  His clinical examination is unremarkable.  His peripheral pulses are completely normal.    Regarding his ischemic heart disease, he is not having any anginal symptoms.  His last cardiac work-up in 2019 was completely normal.  At this time continue his aspirin and Plavix as before.  He is advised to check his blood count.    Regarding his blood pressure, he is on a combination of medication and seems to be working well.  At this time continue the same.  Advised him to recheck his electrolytes along with his renal function    Regarding his hypercholesterolemia continue the Lipitor and recheck his lipids along with the LFT    Regarding the metabolic syndrome, I talked to him again about the low-carb diet.  I also advised him to check his TSH level    Regarding the sleep apnea, he is using the CPAP without any problem    Regarding the history of malignant melanoma he has undergone surgery and so far doing very well    Regarding the numbness involving the toes alone I advised him to undergo lab work to rule out any metabolic causes including diabetes or vitamin deficiency along with sed rate if all these tests are normal and he continues to have these episodes of numbness even during the former time then he needs to undergo nerve conduction study to rule out any neuropathy causing it.    Regarding advanced directive, he apparently does have a living will and power of .  I did advise him to give us a copy of the report.    Based on that result further recommendations will be made.                    Electronically signed by Ovidio Warren MD March 29, 2021 14:22 EDT

## 2021-03-31 ENCOUNTER — LAB (OUTPATIENT)
Dept: LAB | Facility: HOSPITAL | Age: 71
End: 2021-03-31

## 2021-03-31 DIAGNOSIS — E55.9 VITAMIN D DEFICIENCY: ICD-10-CM

## 2021-03-31 DIAGNOSIS — R20.0 NUMBNESS: ICD-10-CM

## 2021-03-31 DIAGNOSIS — I10 ESSENTIAL HYPERTENSION: ICD-10-CM

## 2021-03-31 DIAGNOSIS — R73.9 HYPERGLYCEMIA: ICD-10-CM

## 2021-03-31 DIAGNOSIS — I25.9 IHD (ISCHEMIC HEART DISEASE): ICD-10-CM

## 2021-03-31 DIAGNOSIS — E88.81 METABOLIC SYNDROME: ICD-10-CM

## 2021-03-31 DIAGNOSIS — E78.00 HYPERCHOLESTEREMIA: ICD-10-CM

## 2021-03-31 LAB
ALBUMIN SERPL-MCNC: 4.13 G/DL (ref 3.5–5.2)
ALBUMIN/GLOB SERPL: 1.5 G/DL
ALP SERPL-CCNC: 85 U/L (ref 39–117)
ALT SERPL W P-5'-P-CCNC: 15 U/L (ref 1–41)
ANION GAP SERPL CALCULATED.3IONS-SCNC: 15.7 MMOL/L (ref 5–15)
AST SERPL-CCNC: 18 U/L (ref 1–40)
BASOPHILS # BLD AUTO: 0.06 10*3/MM3 (ref 0–0.2)
BASOPHILS NFR BLD AUTO: 1.2 % (ref 0–1.5)
BILIRUB SERPL-MCNC: 0.6 MG/DL (ref 0–1.2)
BUN SERPL-MCNC: 18 MG/DL (ref 8–23)
BUN/CREAT SERPL: 18.8 (ref 7–25)
CALCIUM SPEC-SCNC: 9.7 MG/DL (ref 8.6–10.5)
CHLORIDE SERPL-SCNC: 103 MMOL/L (ref 98–107)
CHOLEST SERPL-MCNC: 143 MG/DL (ref 0–200)
CO2 SERPL-SCNC: 24.3 MMOL/L (ref 22–29)
CREAT SERPL-MCNC: 0.96 MG/DL (ref 0.76–1.27)
DEPRECATED RDW RBC AUTO: 41.5 FL (ref 37–54)
EOSINOPHIL # BLD AUTO: 0.45 10*3/MM3 (ref 0–0.4)
EOSINOPHIL NFR BLD AUTO: 9.4 % (ref 0.3–6.2)
ERYTHROCYTE [DISTWIDTH] IN BLOOD BY AUTOMATED COUNT: 12.8 % (ref 12.3–15.4)
ERYTHROCYTE [SEDIMENTATION RATE] IN BLOOD: 10 MM/HR (ref 0–20)
GFR SERPL CREATININE-BSD FRML MDRD: 77 ML/MIN/1.73
GLOBULIN UR ELPH-MCNC: 2.8 GM/DL
GLUCOSE SERPL-MCNC: 92 MG/DL (ref 65–99)
HBA1C MFR BLD: 5.3 % (ref 4.8–5.6)
HCT VFR BLD AUTO: 41 % (ref 37.5–51)
HDLC SERPL-MCNC: 43 MG/DL (ref 40–60)
HGB BLD-MCNC: 13.4 G/DL (ref 13–17.7)
IMM GRANULOCYTES # BLD AUTO: 0.02 10*3/MM3 (ref 0–0.05)
IMM GRANULOCYTES NFR BLD AUTO: 0.4 % (ref 0–0.5)
LDLC SERPL CALC-MCNC: 84 MG/DL (ref 0–100)
LDLC/HDLC SERPL: 1.95 {RATIO}
LYMPHOCYTES # BLD AUTO: 1.05 10*3/MM3 (ref 0.7–3.1)
LYMPHOCYTES NFR BLD AUTO: 21.8 % (ref 19.6–45.3)
MCH RBC QN AUTO: 29.1 PG (ref 26.6–33)
MCHC RBC AUTO-ENTMCNC: 32.7 G/DL (ref 31.5–35.7)
MCV RBC AUTO: 88.9 FL (ref 79–97)
MONOCYTES # BLD AUTO: 0.46 10*3/MM3 (ref 0.1–0.9)
MONOCYTES NFR BLD AUTO: 9.6 % (ref 5–12)
NEUTROPHILS NFR BLD AUTO: 2.77 10*3/MM3 (ref 1.7–7)
NEUTROPHILS NFR BLD AUTO: 57.6 % (ref 42.7–76)
NRBC BLD AUTO-RTO: 0 /100 WBC (ref 0–0.2)
PLATELET # BLD AUTO: 274 10*3/MM3 (ref 140–450)
PMV BLD AUTO: 9.8 FL (ref 6–12)
POTASSIUM SERPL-SCNC: 3.9 MMOL/L (ref 3.5–5.2)
PROT SERPL-MCNC: 6.9 G/DL (ref 6–8.5)
RBC # BLD AUTO: 4.61 10*6/MM3 (ref 4.14–5.8)
SODIUM SERPL-SCNC: 143 MMOL/L (ref 136–145)
TRIGL SERPL-MCNC: 81 MG/DL (ref 0–150)
TSH SERPL DL<=0.05 MIU/L-ACNC: 2.7 UIU/ML (ref 0.27–4.2)
VLDLC SERPL-MCNC: 16 MG/DL (ref 5–40)
WBC # BLD AUTO: 4.81 10*3/MM3 (ref 3.4–10.8)

## 2021-03-31 PROCEDURE — 85652 RBC SED RATE AUTOMATED: CPT

## 2021-03-31 PROCEDURE — 83036 HEMOGLOBIN GLYCOSYLATED A1C: CPT

## 2021-03-31 PROCEDURE — 82746 ASSAY OF FOLIC ACID SERUM: CPT

## 2021-03-31 PROCEDURE — 84443 ASSAY THYROID STIM HORMONE: CPT

## 2021-03-31 PROCEDURE — 85025 COMPLETE CBC W/AUTO DIFF WBC: CPT

## 2021-03-31 PROCEDURE — 80061 LIPID PANEL: CPT

## 2021-03-31 PROCEDURE — 82607 VITAMIN B-12: CPT

## 2021-03-31 PROCEDURE — 82306 VITAMIN D 25 HYDROXY: CPT

## 2021-03-31 PROCEDURE — 80053 COMPREHEN METABOLIC PANEL: CPT

## 2021-03-31 PROCEDURE — 36415 COLL VENOUS BLD VENIPUNCTURE: CPT

## 2021-04-01 LAB
25(OH)D3+25(OH)D2 SERPL-MCNC: 47.5 NG/ML (ref 30–100)
FOLATE SERPL-MCNC: 16.7 NG/ML
VIT B12 SERPL-MCNC: 378 PG/ML (ref 232–1245)

## 2021-11-15 ENCOUNTER — TELEPHONE (OUTPATIENT)
Dept: CARDIOLOGY | Facility: CLINIC | Age: 71
End: 2021-11-15

## 2021-11-15 ENCOUNTER — OFFICE VISIT (OUTPATIENT)
Dept: CARDIOLOGY | Facility: CLINIC | Age: 71
End: 2021-11-15

## 2021-11-15 VITALS
BODY MASS INDEX: 31.78 KG/M2 | TEMPERATURE: 96.9 F | HEART RATE: 60 BPM | DIASTOLIC BLOOD PRESSURE: 92 MMHG | SYSTOLIC BLOOD PRESSURE: 154 MMHG | HEIGHT: 71 IN | WEIGHT: 227 LBS

## 2021-11-15 DIAGNOSIS — E78.00 HYPERCHOLESTEREMIA: ICD-10-CM

## 2021-11-15 DIAGNOSIS — I25.9 IHD (ISCHEMIC HEART DISEASE): Primary | ICD-10-CM

## 2021-11-15 DIAGNOSIS — E88.81 METABOLIC SYNDROME: ICD-10-CM

## 2021-11-15 DIAGNOSIS — I10 ESSENTIAL HYPERTENSION: ICD-10-CM

## 2021-11-15 DIAGNOSIS — R01.1 MURMUR, CARDIAC: ICD-10-CM

## 2021-11-15 DIAGNOSIS — G47.30 SLEEP APNEA IN ADULT: ICD-10-CM

## 2021-11-15 DIAGNOSIS — G57.12 MERALGIA PARESTHETICA OF LEFT SIDE: ICD-10-CM

## 2021-11-15 DIAGNOSIS — R35.0 FREQUENCY OF MICTURITION: ICD-10-CM

## 2021-11-15 PROCEDURE — 99214 OFFICE O/P EST MOD 30 MIN: CPT | Performed by: INTERNAL MEDICINE

## 2021-11-15 RX ORDER — LABETALOL 200 MG/1
200 TABLET, FILM COATED ORAL EVERY 12 HOURS SCHEDULED
Qty: 180 TABLET | Refills: 3 | Status: SHIPPED | OUTPATIENT
Start: 2021-11-15 | End: 2022-07-07 | Stop reason: SDUPTHER

## 2021-11-15 RX ORDER — ATORVASTATIN CALCIUM 20 MG/1
20 TABLET, FILM COATED ORAL DAILY
Qty: 90 TABLET | Refills: 3 | Status: SHIPPED | OUTPATIENT
Start: 2021-11-15 | End: 2022-07-07 | Stop reason: SDUPTHER

## 2021-11-15 RX ORDER — AMLODIPINE BESYLATE AND BENAZEPRIL HYDROCHLORIDE 10; 20 MG/1; MG/1
1 CAPSULE ORAL DAILY
Qty: 90 CAPSULE | Refills: 3 | Status: SHIPPED | OUTPATIENT
Start: 2021-11-15 | End: 2021-11-15

## 2021-11-15 RX ORDER — CLOPIDOGREL BISULFATE 75 MG/1
75 TABLET ORAL DAILY
Qty: 90 TABLET | Refills: 3 | Status: SHIPPED | OUTPATIENT
Start: 2021-11-15 | End: 2022-07-07 | Stop reason: SDUPTHER

## 2021-11-15 RX ORDER — NIFEDIPINE 60 MG/1
60 TABLET, EXTENDED RELEASE ORAL DAILY
Qty: 90 TABLET | Refills: 3 | Status: SHIPPED | OUTPATIENT
Start: 2021-11-15 | End: 2022-07-07 | Stop reason: SDUPTHER

## 2021-11-15 RX ORDER — LOSARTAN POTASSIUM AND HYDROCHLOROTHIAZIDE 25; 100 MG/1; MG/1
1 TABLET ORAL DAILY
Qty: 90 TABLET | Refills: 3 | Status: SHIPPED | OUTPATIENT
Start: 2021-11-15 | End: 2022-07-07 | Stop reason: SDUPTHER

## 2021-11-15 NOTE — PROGRESS NOTES
Chief Complaint   Patient presents with   • Follow-up     for cardiac management, doing well   • Med Refill     refills needed on cardiac meds, 90 days to [facility]   • Labs     most recent labs from March in chart       CARDIAC COMPLAINTS  Leg Pain        Subjective   Edgardo Ortez is a 71 y.o. male came in today for his follow-up visit.  He has history of ischemic heart disease where he underwent stenting in 2005 and 2006.  His last cardiac work-up was in 2019 showed no evidence of ischemia.  He came in today for his regular follow-up visit.  He complained of having some pain involving his left lower extremities.  Initially he thought it was due to arthritis but later he found out that whenever he lies down on the left side then he has this numbness but the minute he turns around his symptoms gets better.  He had some labs done in March his thyroid function test was normal.  His cholesterol was very well controlled at 143 with the LDL of 84 HDL of 43 triglyceride of 81.  His A1c was normal.              Cardiac History  Past Surgical History:   Procedure Laterality Date   • CARDIAC CATHETERIZATION  2005    PCI of LAD & D1     • CARDIAC CATHETERIZATION  11/02/2006    75% LAD- 3.0 x 33 mm Cypher. 70% D1 2.5 x 18 mm Cypher Stent.     • CARDIOVASCULAR STRESS TEST  05/29/2007    10 Min 75% THR.Negative     • CARDIOVASCULAR STRESS TEST  05/18/2009    10 min, 98% tHR. Negative     • CARDIOVASCULAR STRESS TEST  07/12/2010    10 min. 83% THR. BP- 160/70. Anterior Ischemia.     • CARDIOVASCULAR STRESS TEST  09/08/2011    10 Min.85%THR. Negative     • CARDIOVASCULAR STRESS TEST  08/13/2013    10 Min,15 Secs. 89% THR. BP- 172/90. Negative.     • CARDIOVASCULAR STRESS TEST  04/13/2015    10 Min, 84% THR. BP- 184/78. Negative     • CARDIOVASCULAR STRESS TEST  02/18/2019    10 Min. 12.8 METS. 88% THR. BP- 172/80. EF 60%. Negative.   • CONVERTED (HISTORICAL) DUPLEX SCANS  04/13/2015    Carotid US- Normal.     • ECHO - CONVERTED   05/29/2007     EF 65%     • ECHO - CONVERTED  05/18/2009    EF >60%, Mild MR.     • ECHO - CONVERTED  07/12/2010    EF >60%, RVSP- 32.09mmHg.     • ECHO - CONVERTED  09/08/2011     EF 65%.     • ECHO - CONVERTED  08/13/2013    EF 65%. RVSp- 39 mmHg.     • ECHO - CONVERTED  04/13/2015    EF 65%     • ECHO - CONVERTED  02/18/2019    EF 60-65%. LA- 4.6 Cm. Mild MR. RVSP- 35 mmHg.   • US CAROTID UNILATERAL  08/13/2020    No sig stenosis       Current Outpatient Medications   Medication Sig Dispense Refill   • aspirin 81 MG EC tablet Take 81 mg by mouth Daily.     • atorvastatin (LIPITOR) 20 MG tablet Take 1 tablet by mouth Daily. 90 tablet 3   • clopidogrel (PLAVIX) 75 MG tablet Take 1 tablet by mouth Daily. 90 tablet 3   • labetalol (NORMODYNE) 200 MG tablet Take 1 tablet by mouth Every 12 (Twelve) Hours. 180 tablet 3   • losartan-hydrochlorothiazide (HYZAAR) 100-25 MG per tablet Take 1 tablet by mouth Daily. 90 tablet 3   • tadalafil (CIALIS) 5 MG tablet Take 5 mg by mouth Daily As Needed for Erectile Dysfunction.     • NIFEdipine XL (Procardia XL) 60 MG 24 hr tablet Take 1 tablet by mouth Daily. 90 tablet 3     No current facility-administered medications for this visit.       Allergies  :  Patient has no known allergies.       Past Medical History:   Diagnosis Date   • H/O prostate biopsy     negative   • Hypercholesterolemia    • Hypertension    • IHD (ischemic heart disease)     S/P stenting of LAD & diagonal   • MR (mitral regurgitation)     mild   • pulse oximetry 07/07/2010    Overnight oximetry- Abnormal, sleep study ordered, pt. wants to lose wt. first.       Social History     Socioeconomic History   • Marital status:    Tobacco Use   • Smoking status: Never Smoker   • Smokeless tobacco: Never Used   Substance and Sexual Activity   • Alcohol use: Yes     Comment: states he drinks beer or scotch every other day   • Drug use: No       Family History   Problem Relation Age of Onset   • Heart disease  "Mother    • Heart disease Father        Review of Systems   Constitutional: Negative for decreased appetite and malaise/fatigue.   HENT: Negative for congestion and sore throat.    Eyes: Negative for blurred vision.   Cardiovascular: Negative for chest pain and palpitations.   Respiratory: Negative for shortness of breath and snoring.    Endocrine: Negative for cold intolerance and heat intolerance.   Hematologic/Lymphatic: Negative for adenopathy. Does not bruise/bleed easily.   Skin: Negative for itching, nail changes and skin cancer.   Musculoskeletal: Positive for arthritis. Negative for myalgias.   Gastrointestinal: Negative for abdominal pain, dysphagia and heartburn.   Genitourinary: Negative for bladder incontinence and frequency.   Neurological: Negative for dizziness, light-headedness, seizures and vertigo.   Psychiatric/Behavioral: Negative for altered mental status.   Allergic/Immunologic: Negative for environmental allergies and hives.       Diabetes- No  Thyroid- normal    Objective     /92   Pulse 60   Temp 96.9 °F (36.1 °C)   Ht 180.3 cm (71\")   Wt 103 kg (227 lb)   BMI 31.66 kg/m²     Vitals and nursing note reviewed.   Constitutional:       Appearance: Healthy appearance. Not in distress.   Eyes:      Conjunctiva/sclera: Conjunctivae normal.      Pupils: Pupils are equal, round, and reactive to light.   HENT:      Head: Normocephalic.   Pulmonary:      Effort: Pulmonary effort is normal.      Breath sounds: Normal breath sounds.   Cardiovascular:      PMI at left midclavicular line. Normal rate. Regular rhythm.   Abdominal:      General: Bowel sounds are normal.      Palpations: Abdomen is soft.   Musculoskeletal: Normal range of motion.      Cervical back: Normal range of motion and neck supple. Skin:     General: Skin is warm and dry.   Neurological:      Mental Status: Alert, oriented to person, place, and time and oriented to person, place and time.       Procedures          "   Assessment/Plan   Patient's Body mass index is 31.66 kg/m². indicating that he is obese (BMI >30). Obesity-related health conditions include the following: obstructive sleep apnea, hypertension, coronary heart disease and dyslipidemias. Obesity is unchanged. BMI is is above average; BMI management plan is completed. We discussed portion control and increasing exercise..     Diagnoses and all orders for this visit:    1. IHD (ischemic heart disease) (Primary)  -     clopidogrel (PLAVIX) 75 MG tablet; Take 1 tablet by mouth Daily.  Dispense: 90 tablet; Refill: 3  -     CBC & Differential; Future  -     PSA DIAGNOSTIC; Future    2. Essential hypertension  -     Discontinue: amLODIPine-benazepril (LOTREL) 10-20 MG per capsule; Take 1 capsule by mouth Daily.  Dispense: 90 capsule; Refill: 3  -     losartan-hydrochlorothiazide (HYZAAR) 100-25 MG per tablet; Take 1 tablet by mouth Daily.  Dispense: 90 tablet; Refill: 3  -     labetalol (NORMODYNE) 200 MG tablet; Take 1 tablet by mouth Every 12 (Twelve) Hours.  Dispense: 180 tablet; Refill: 3  -     NIFEdipine XL (Procardia XL) 60 MG 24 hr tablet; Take 1 tablet by mouth Daily.  Dispense: 90 tablet; Refill: 3  -     Comprehensive Metabolic Panel; Future    3. Hypercholesteremia  -     atorvastatin (LIPITOR) 20 MG tablet; Take 1 tablet by mouth Daily.  Dispense: 90 tablet; Refill: 3  -     Lipid Panel; Future    4. Metabolic syndrome    5. Murmur, cardiac    6. Sleep apnea in adult    7. Meralgia paresthetica of left side    8. Frequency of micturition   -     PSA DIAGNOSTIC; Future       At baseline his heart rate is stable but his blood pressure is still slightly elevated.  His BMI is still around 32.  His clinical examination is otherwise unremarkable.    Regarding his ischemic heart disease, he is not having any anginal symptoms.  At this time continue Plavix along with the aspirin.  I did advise him to check his CBC level.    Regarding his hypertension, it is still  elevated.  Advised him to discontinue Lotrel.  Continue Hyzaar and labetalol.  I added Procardia XL at 60 mg once a day.  I did advise him to check his electrolytes and renal function.    Regarding his hypercholesterolemia, at this time continue the Lipitor at 20 mg.  Recheck his lipids along with the LFT    Regarding his metabolic syndrome, it is still about 30.  I had a long talk with him again about the low-carb diet.    Regarding the cardiac murmur it is secondary to mitral regurgitation, it still appears to be mild so we will continue to monitor    Regarding his sleep apnea, he does use his CPAP.  I encouraged him to continue to use it    Regarding his pain in his left leg appears to be more of a meralgia.  I did talk to him about it.  If the symptoms gets worse, he may need to undergo nerve conduction study and consider using Neurontin or Lyrica    Regarding his frequent urination at night, like to get his PSA level checked    Overall cardiac status appears stable.  I will see him back in 6 months or sooner if needed.                      Electronically signed by Ovidio Warren MD November 15, 2021 17:14 EST

## 2021-11-19 ENCOUNTER — TELEPHONE (OUTPATIENT)
Dept: CARDIOLOGY | Facility: CLINIC | Age: 71
End: 2021-11-19

## 2021-11-19 NOTE — TELEPHONE ENCOUNTER
Spoke with [facility] pharmacy to clarify that Lotrel was stopped when Nifedipine was started. Spoke with Clare Rodriguez Pharmacist.

## 2021-12-08 ENCOUNTER — LAB (OUTPATIENT)
Dept: LAB | Facility: HOSPITAL | Age: 71
End: 2021-12-08

## 2021-12-08 DIAGNOSIS — E78.00 HYPERCHOLESTEREMIA: ICD-10-CM

## 2021-12-08 DIAGNOSIS — I10 ESSENTIAL HYPERTENSION: ICD-10-CM

## 2021-12-08 DIAGNOSIS — I25.9 IHD (ISCHEMIC HEART DISEASE): ICD-10-CM

## 2021-12-08 LAB
ALBUMIN SERPL-MCNC: 4.32 G/DL (ref 3.5–5.2)
ALBUMIN/GLOB SERPL: 1.8 G/DL
ALP SERPL-CCNC: 86 U/L (ref 39–117)
ALT SERPL W P-5'-P-CCNC: 17 U/L (ref 1–41)
ANION GAP SERPL CALCULATED.3IONS-SCNC: 12.1 MMOL/L (ref 5–15)
AST SERPL-CCNC: 16 U/L (ref 1–40)
BASOPHILS # BLD AUTO: 0.07 10*3/MM3 (ref 0–0.2)
BASOPHILS NFR BLD AUTO: 1.2 % (ref 0–1.5)
BILIRUB SERPL-MCNC: 0.4 MG/DL (ref 0–1.2)
BUN SERPL-MCNC: 18 MG/DL (ref 8–23)
BUN/CREAT SERPL: 16.7 (ref 7–25)
CALCIUM SPEC-SCNC: 9.9 MG/DL (ref 8.6–10.5)
CHLORIDE SERPL-SCNC: 103 MMOL/L (ref 98–107)
CHOLEST SERPL-MCNC: 170 MG/DL (ref 0–200)
CO2 SERPL-SCNC: 26.9 MMOL/L (ref 22–29)
CREAT SERPL-MCNC: 1.08 MG/DL (ref 0.76–1.27)
DEPRECATED RDW RBC AUTO: 41.5 FL (ref 37–54)
EOSINOPHIL # BLD AUTO: 0.37 10*3/MM3 (ref 0–0.4)
EOSINOPHIL NFR BLD AUTO: 6.4 % (ref 0.3–6.2)
ERYTHROCYTE [DISTWIDTH] IN BLOOD BY AUTOMATED COUNT: 12.8 % (ref 12.3–15.4)
GFR SERPL CREATININE-BSD FRML MDRD: 67 ML/MIN/1.73
GLOBULIN UR ELPH-MCNC: 2.4 GM/DL
GLUCOSE SERPL-MCNC: 100 MG/DL (ref 65–99)
HCT VFR BLD AUTO: 40.5 % (ref 37.5–51)
HDLC SERPL-MCNC: 50 MG/DL (ref 40–60)
HGB BLD-MCNC: 13.7 G/DL (ref 13–17.7)
IMM GRANULOCYTES # BLD AUTO: 0.02 10*3/MM3 (ref 0–0.05)
IMM GRANULOCYTES NFR BLD AUTO: 0.3 % (ref 0–0.5)
LDLC SERPL CALC-MCNC: 101 MG/DL (ref 0–100)
LDLC/HDLC SERPL: 1.97 {RATIO}
LYMPHOCYTES # BLD AUTO: 1.26 10*3/MM3 (ref 0.7–3.1)
LYMPHOCYTES NFR BLD AUTO: 21.8 % (ref 19.6–45.3)
MCH RBC QN AUTO: 30 PG (ref 26.6–33)
MCHC RBC AUTO-ENTMCNC: 33.8 G/DL (ref 31.5–35.7)
MCV RBC AUTO: 88.8 FL (ref 79–97)
MONOCYTES # BLD AUTO: 0.72 10*3/MM3 (ref 0.1–0.9)
MONOCYTES NFR BLD AUTO: 12.4 % (ref 5–12)
NEUTROPHILS NFR BLD AUTO: 3.35 10*3/MM3 (ref 1.7–7)
NEUTROPHILS NFR BLD AUTO: 57.9 % (ref 42.7–76)
NRBC BLD AUTO-RTO: 0 /100 WBC (ref 0–0.2)
PLATELET # BLD AUTO: 292 10*3/MM3 (ref 140–450)
PMV BLD AUTO: 10.1 FL (ref 6–12)
POTASSIUM SERPL-SCNC: 3.6 MMOL/L (ref 3.5–5.2)
PROT SERPL-MCNC: 6.7 G/DL (ref 6–8.5)
RBC # BLD AUTO: 4.56 10*6/MM3 (ref 4.14–5.8)
SODIUM SERPL-SCNC: 142 MMOL/L (ref 136–145)
TRIGL SERPL-MCNC: 107 MG/DL (ref 0–150)
VLDLC SERPL-MCNC: 19 MG/DL (ref 5–40)
WBC NRBC COR # BLD: 5.79 10*3/MM3 (ref 3.4–10.8)

## 2021-12-08 PROCEDURE — 85025 COMPLETE CBC W/AUTO DIFF WBC: CPT

## 2021-12-08 PROCEDURE — 80053 COMPREHEN METABOLIC PANEL: CPT

## 2021-12-08 PROCEDURE — 84153 ASSAY OF PSA TOTAL: CPT | Performed by: INTERNAL MEDICINE

## 2021-12-08 PROCEDURE — 80061 LIPID PANEL: CPT

## 2022-07-07 ENCOUNTER — OFFICE VISIT (OUTPATIENT)
Dept: CARDIOLOGY | Facility: CLINIC | Age: 72
End: 2022-07-07

## 2022-07-07 VITALS
HEART RATE: 60 BPM | BODY MASS INDEX: 31.22 KG/M2 | HEIGHT: 71 IN | DIASTOLIC BLOOD PRESSURE: 80 MMHG | SYSTOLIC BLOOD PRESSURE: 134 MMHG | WEIGHT: 223 LBS

## 2022-07-07 DIAGNOSIS — R73.9 HYPERGLYCEMIA: ICD-10-CM

## 2022-07-07 DIAGNOSIS — R35.0 FREQUENCY OF MICTURITION: ICD-10-CM

## 2022-07-07 DIAGNOSIS — I25.9 IHD (ISCHEMIC HEART DISEASE): Primary | ICD-10-CM

## 2022-07-07 DIAGNOSIS — E55.9 VITAMIN D DEFICIENCY: ICD-10-CM

## 2022-07-07 DIAGNOSIS — C43.9 MALIGNANT MELANOMA OF SKIN: ICD-10-CM

## 2022-07-07 DIAGNOSIS — E78.00 HYPERCHOLESTEREMIA: ICD-10-CM

## 2022-07-07 DIAGNOSIS — I10 ESSENTIAL HYPERTENSION: ICD-10-CM

## 2022-07-07 DIAGNOSIS — G57.12 MERALGIA PARESTHETICA OF LEFT SIDE: ICD-10-CM

## 2022-07-07 DIAGNOSIS — E88.81 METABOLIC SYNDROME: ICD-10-CM

## 2022-07-07 DIAGNOSIS — G47.30 SLEEP APNEA IN ADULT: ICD-10-CM

## 2022-07-07 PROCEDURE — 99214 OFFICE O/P EST MOD 30 MIN: CPT | Performed by: INTERNAL MEDICINE

## 2022-07-07 RX ORDER — CLOPIDOGREL BISULFATE 75 MG/1
75 TABLET ORAL DAILY
Qty: 90 TABLET | Refills: 3 | Status: SHIPPED | OUTPATIENT
Start: 2022-07-07 | End: 2023-02-02 | Stop reason: SDUPTHER

## 2022-07-07 RX ORDER — ATORVASTATIN CALCIUM 20 MG/1
20 TABLET, FILM COATED ORAL DAILY
Qty: 90 TABLET | Refills: 3 | Status: SHIPPED | OUTPATIENT
Start: 2022-07-07 | End: 2023-02-02 | Stop reason: SDUPTHER

## 2022-07-07 RX ORDER — TADALAFIL 5 MG/1
5 TABLET ORAL DAILY
Qty: 90 TABLET | Refills: 3 | Status: SHIPPED | OUTPATIENT
Start: 2022-07-07 | End: 2023-02-02 | Stop reason: SDUPTHER

## 2022-07-07 RX ORDER — NIFEDIPINE 60 MG/1
60 TABLET, EXTENDED RELEASE ORAL DAILY
Qty: 90 TABLET | Refills: 3 | Status: SHIPPED | OUTPATIENT
Start: 2022-07-07 | End: 2023-02-02 | Stop reason: SDUPTHER

## 2022-07-07 RX ORDER — LOSARTAN POTASSIUM AND HYDROCHLOROTHIAZIDE 25; 100 MG/1; MG/1
1 TABLET ORAL DAILY
Qty: 90 TABLET | Refills: 3 | Status: SHIPPED | OUTPATIENT
Start: 2022-07-07 | End: 2023-02-02 | Stop reason: SDUPTHER

## 2022-07-07 RX ORDER — LABETALOL 200 MG/1
200 TABLET, FILM COATED ORAL EVERY 12 HOURS SCHEDULED
Qty: 180 TABLET | Refills: 3 | Status: SHIPPED | OUTPATIENT
Start: 2022-07-07 | End: 2023-02-02 | Stop reason: SDUPTHER

## 2022-07-07 NOTE — PROGRESS NOTES
Chief Complaint   Patient presents with   • Follow-up     For cardiac management, doing really good     • Med Refill     Refills needed on cardiac meds. 90 days to [facility].    • Labs     No recent labs, would like orders put in for our lab.        CARDIAC COMPLAINTS  Cardiac management          Subjective   Edgardo Ortez is a 72 y.o. male came in today for his regular follow-up visit.  He has history of documented ischemic heart disease for which he has undergone stenting of the LAD in 2005 followed by another stenting in 2006.  His last cardiac work-up which was done in 2019 showed normal LV systolic function and no evidence of ischemia.  During his last visit minor changes were made in his blood pressure medication.  He also had meralgia.  He was advised to undergo nerve conduction study.  Apparently after about few months the symptoms subsided so he never had his nerve conduction study done.  His lab work which was done in December showed the LDL was slightly elevated at 101.  His renal function was normal but his blood sugar was slightly elevated.  His blood count was within normal limit he did have mild eosinophilia.  He came today with no new symptoms.  Feeling much better.  He has been trying to lose weight and lost only few pounds.              Cardiac History  Past Surgical History:   Procedure Laterality Date   • CARDIAC CATHETERIZATION  2005    PCI of LAD & D1     • CARDIAC CATHETERIZATION  11/02/2006    75% LAD- 3.0 x 33 mm Cypher. 70% D1 2.5 x 18 mm Cypher Stent.     • CARDIOVASCULAR STRESS TEST  05/29/2007    10 Min 75% THR.Negative     • CARDIOVASCULAR STRESS TEST  05/18/2009    10 min, 98% tHR. Negative     • CARDIOVASCULAR STRESS TEST  07/12/2010    10 min. 83% THR. BP- 160/70. Anterior Ischemia.     • CARDIOVASCULAR STRESS TEST  09/08/2011    10 Min.85%THR. Negative     • CARDIOVASCULAR STRESS TEST  08/13/2013    10 Min,15 Secs. 89% THR. BP- 172/90. Negative.     • CARDIOVASCULAR STRESS TEST   04/13/2015    10 Min, 84% THR. BP- 184/78. Negative     • CARDIOVASCULAR STRESS TEST  02/18/2019    10 Min. 12.8 METS. 88% THR. BP- 172/80. EF 60%. Negative.   • CONVERTED (HISTORICAL) DUPLEX SCANS  04/13/2015    Carotid US- Normal.     • ECHO - CONVERTED  05/29/2007     EF 65%     • ECHO - CONVERTED  05/18/2009    EF >60%, Mild MR.     • ECHO - CONVERTED  07/12/2010    EF >60%, RVSP- 32.09mmHg.     • ECHO - CONVERTED  09/08/2011     EF 65%.     • ECHO - CONVERTED  08/13/2013    EF 65%. RVSp- 39 mmHg.     • ECHO - CONVERTED  04/13/2015    EF 65%     • ECHO - CONVERTED  02/18/2019    EF 60-65%. LA- 4.6 Cm. Mild MR. RVSP- 35 mmHg.   • US CAROTID UNILATERAL  08/13/2020    No sig stenosis       Current Outpatient Medications   Medication Sig Dispense Refill   • aspirin 81 MG EC tablet Take 81 mg by mouth Daily.     • atorvastatin (LIPITOR) 20 MG tablet Take 1 tablet by mouth Daily. 90 tablet 3   • clopidogrel (PLAVIX) 75 MG tablet Take 1 tablet by mouth Daily. 90 tablet 3   • labetalol (NORMODYNE) 200 MG tablet Take 1 tablet by mouth Every 12 (Twelve) Hours. 180 tablet 3   • losartan-hydrochlorothiazide (HYZAAR) 100-25 MG per tablet Take 1 tablet by mouth Daily. 90 tablet 3   • NIFEdipine XL (Procardia XL) 60 MG 24 hr tablet Take 1 tablet by mouth Daily. 90 tablet 3   • tadalafil (CIALIS) 5 MG tablet Take 1 tablet by mouth Daily. 90 tablet 3     No current facility-administered medications for this visit.       Allergies  :  Patient has no known allergies.       Past Medical History:   Diagnosis Date   • H/O prostate biopsy     negative   • Hypercholesterolemia    • Hypertension    • IHD (ischemic heart disease)     S/P stenting of LAD & diagonal   • MR (mitral regurgitation)     mild   • pulse oximetry 07/07/2010    Overnight oximetry- Abnormal, sleep study ordered, pt. wants to lose wt. first.       Social History     Socioeconomic History   • Marital status:    Tobacco Use   • Smoking status: Never Smoker   •  "Smokeless tobacco: Never Used   Substance and Sexual Activity   • Alcohol use: Yes     Comment: states he drinks beer or scotch every other day   • Drug use: No       Family History   Problem Relation Age of Onset   • Heart disease Mother    • Heart disease Father        Review of Systems   Constitutional: Negative for decreased appetite and malaise/fatigue.   HENT: Negative for congestion and sore throat.    Eyes: Negative for blurred vision.   Cardiovascular: Negative for chest pain and palpitations.   Respiratory: Negative for shortness of breath and snoring.    Endocrine: Negative for cold intolerance and heat intolerance.   Hematologic/Lymphatic: Negative for adenopathy. Does not bruise/bleed easily.   Skin: Negative for itching, nail changes and skin cancer.   Musculoskeletal: Negative for arthritis and myalgias.   Gastrointestinal: Negative for abdominal pain, dysphagia and heartburn.   Genitourinary: Positive for frequency and nocturia. Negative for bladder incontinence.   Neurological: Negative for dizziness, light-headedness, seizures and vertigo.   Psychiatric/Behavioral: Negative for altered mental status.   Allergic/Immunologic: Negative for environmental allergies and hives.       Diabetes- No  Thyroid- normal    Objective     /80   Pulse 60   Ht 180.3 cm (71\")   Wt 101 kg (223 lb)   BMI 31.10 kg/m²     Vitals and nursing note reviewed.   Constitutional:       Appearance: Healthy appearance. Not in distress.   Eyes:      Conjunctiva/sclera: Conjunctivae normal.      Pupils: Pupils are equal, round, and reactive to light.   HENT:      Head: Normocephalic.   Pulmonary:      Effort: Pulmonary effort is normal.      Breath sounds: Normal breath sounds.   Cardiovascular:      PMI at left midclavicular line. Normal rate. Regular rhythm.   Abdominal:      General: Bowel sounds are normal.      Palpations: Abdomen is soft.   Musculoskeletal: Normal range of motion.      Cervical back: Normal range of " motion and neck supple. Skin:     General: Skin is warm and dry.   Neurological:      Mental Status: Alert, oriented to person, place, and time and oriented to person, place and time.       Procedures            @ASSESSMENT/PLAN@  BMI is >= 30 and <35. (Class 1 Obesity). The following options were offered after discussion;: nutrition counseling/recommendations     Diagnoses and all orders for this visit:    1. IHD (ischemic heart disease) (Primary)  -     clopidogrel (PLAVIX) 75 MG tablet; Take 1 tablet by mouth Daily.  Dispense: 90 tablet; Refill: 3  -     CBC & Differential; Future    2. Essential hypertension  -     losartan-hydrochlorothiazide (HYZAAR) 100-25 MG per tablet; Take 1 tablet by mouth Daily.  Dispense: 90 tablet; Refill: 3  -     labetalol (NORMODYNE) 200 MG tablet; Take 1 tablet by mouth Every 12 (Twelve) Hours.  Dispense: 180 tablet; Refill: 3  -     NIFEdipine XL (Procardia XL) 60 MG 24 hr tablet; Take 1 tablet by mouth Daily.  Dispense: 90 tablet; Refill: 3  -     Comprehensive Metabolic Panel; Future    3. Hypercholesteremia  -     atorvastatin (LIPITOR) 20 MG tablet; Take 1 tablet by mouth Daily.  Dispense: 90 tablet; Refill: 3  -     Lipid Panel; Future    4. Metabolic syndrome    5. Hyperglycemia  -     Hemoglobin A1c; Future    6. Frequency of micturition   -     tadalafil (CIALIS) 5 MG tablet; Take 1 tablet by mouth Daily.  Dispense: 90 tablet; Refill: 3    7. Meralgia paresthetica of left side    8. Sleep apnea in adult    9. Malignant melanoma of skin (HCC)    10. Vitamin D deficiency  -     Vitamin D 25 Hydroxy; Future       At baseline his heart rate is stable.  His blood pressure is well controlled.  His clinical examination reveals a BMI of 31.  His cardiovascular examination is otherwise within normal limit.    Regarding his ischemic heart disease, he has undergone multiple stenting.  He has not had any anginal symptoms.  His stress test in 2019 was normal.  He may need to repeat it  next year.  Meanwhile continue Plavix.  I also advised him to check his blood count.    Regarding his hypertension, it is very well controlled with a combination of Hyzaar, labetalol and Procardia XL.  Continue the same and have the renal function checked along with electrolytes    Regarding his hypercholesterolemia, he is on Lipitor at 20 mg.  I like to repeat his lipid profile.  If the LDL is still elevated then will increase the dose.    Regarding his metabolic syndrome, I talked to him about the low-carb diet.    Regarding his mild hyperglycemia, will check his A1c level    Regarding his frequent micturition, Cialis 5 mg seems to be helping.  Continue the same    Regarding the meralgia, it subsided.  At this time no need for further work-up    Regarding his sleep apnea, he is using the CPAP    Regarding his malignant melanoma, he still follows with Dr. Jeffery.    Regarding his advanced directive, he does have a living will and power of  and discussed with him about it.    Overall cardiac status appears stable.  I will see him back in 6 months or sooner if needed.                  Electronically signed by Ovidio Warren MD July 7, 2022 15:54 EDT

## 2022-07-14 ENCOUNTER — LAB (OUTPATIENT)
Dept: LAB | Facility: HOSPITAL | Age: 72
End: 2022-07-14

## 2022-07-14 DIAGNOSIS — I25.9 IHD (ISCHEMIC HEART DISEASE): ICD-10-CM

## 2022-07-14 DIAGNOSIS — E78.00 HYPERCHOLESTEREMIA: ICD-10-CM

## 2022-07-14 DIAGNOSIS — E55.9 VITAMIN D DEFICIENCY: ICD-10-CM

## 2022-07-14 DIAGNOSIS — I10 ESSENTIAL HYPERTENSION: ICD-10-CM

## 2022-07-14 DIAGNOSIS — R73.9 HYPERGLYCEMIA: ICD-10-CM

## 2022-07-14 LAB
ALBUMIN SERPL-MCNC: 4.7 G/DL (ref 3.5–5.2)
ALBUMIN/GLOB SERPL: 1.9 G/DL
ALP SERPL-CCNC: 86 U/L (ref 39–117)
ALT SERPL W P-5'-P-CCNC: 20 U/L (ref 1–41)
ANION GAP SERPL CALCULATED.3IONS-SCNC: 14.9 MMOL/L (ref 5–15)
AST SERPL-CCNC: 20 U/L (ref 1–40)
BASOPHILS # BLD AUTO: 0.07 10*3/MM3 (ref 0–0.2)
BASOPHILS NFR BLD AUTO: 1.2 % (ref 0–1.5)
BILIRUB SERPL-MCNC: 0.7 MG/DL (ref 0–1.2)
BUN SERPL-MCNC: 21 MG/DL (ref 8–23)
BUN/CREAT SERPL: 20 (ref 7–25)
CALCIUM SPEC-SCNC: 10 MG/DL (ref 8.6–10.5)
CHLORIDE SERPL-SCNC: 99 MMOL/L (ref 98–107)
CHOLEST SERPL-MCNC: 167 MG/DL (ref 0–200)
CO2 SERPL-SCNC: 26.1 MMOL/L (ref 22–29)
CREAT SERPL-MCNC: 1.05 MG/DL (ref 0.76–1.27)
DEPRECATED RDW RBC AUTO: 42.8 FL (ref 37–54)
EGFRCR SERPLBLD CKD-EPI 2021: 75.4 ML/MIN/1.73
EOSINOPHIL # BLD AUTO: 0.46 10*3/MM3 (ref 0–0.4)
EOSINOPHIL NFR BLD AUTO: 7.9 % (ref 0.3–6.2)
ERYTHROCYTE [DISTWIDTH] IN BLOOD BY AUTOMATED COUNT: 13.2 % (ref 12.3–15.4)
GLOBULIN UR ELPH-MCNC: 2.5 GM/DL
GLUCOSE SERPL-MCNC: 93 MG/DL (ref 65–99)
HBA1C MFR BLD: 5.2 % (ref 4.8–5.6)
HCT VFR BLD AUTO: 40.6 % (ref 37.5–51)
HDLC SERPL-MCNC: 43 MG/DL (ref 40–60)
HGB BLD-MCNC: 13.7 G/DL (ref 13–17.7)
IMM GRANULOCYTES # BLD AUTO: 0.02 10*3/MM3 (ref 0–0.05)
IMM GRANULOCYTES NFR BLD AUTO: 0.3 % (ref 0–0.5)
LDLC SERPL CALC-MCNC: 98 MG/DL (ref 0–100)
LDLC/HDLC SERPL: 2.19 {RATIO}
LYMPHOCYTES # BLD AUTO: 1.09 10*3/MM3 (ref 0.7–3.1)
LYMPHOCYTES NFR BLD AUTO: 18.8 % (ref 19.6–45.3)
MCH RBC QN AUTO: 29.7 PG (ref 26.6–33)
MCHC RBC AUTO-ENTMCNC: 33.7 G/DL (ref 31.5–35.7)
MCV RBC AUTO: 88.1 FL (ref 79–97)
MONOCYTES # BLD AUTO: 0.65 10*3/MM3 (ref 0.1–0.9)
MONOCYTES NFR BLD AUTO: 11.2 % (ref 5–12)
NEUTROPHILS NFR BLD AUTO: 3.5 10*3/MM3 (ref 1.7–7)
NEUTROPHILS NFR BLD AUTO: 60.6 % (ref 42.7–76)
NRBC BLD AUTO-RTO: 0 /100 WBC (ref 0–0.2)
PLATELET # BLD AUTO: 280 10*3/MM3 (ref 140–450)
PMV BLD AUTO: 9.6 FL (ref 6–12)
POTASSIUM SERPL-SCNC: 3.2 MMOL/L (ref 3.5–5.2)
PROT SERPL-MCNC: 7.2 G/DL (ref 6–8.5)
RBC # BLD AUTO: 4.61 10*6/MM3 (ref 4.14–5.8)
SODIUM SERPL-SCNC: 140 MMOL/L (ref 136–145)
TRIGL SERPL-MCNC: 149 MG/DL (ref 0–150)
VLDLC SERPL-MCNC: 26 MG/DL (ref 5–40)
WBC NRBC COR # BLD: 5.79 10*3/MM3 (ref 3.4–10.8)

## 2022-07-14 PROCEDURE — 80053 COMPREHEN METABOLIC PANEL: CPT

## 2022-07-14 PROCEDURE — 80061 LIPID PANEL: CPT

## 2022-07-14 PROCEDURE — 36415 COLL VENOUS BLD VENIPUNCTURE: CPT

## 2022-07-14 PROCEDURE — 83036 HEMOGLOBIN GLYCOSYLATED A1C: CPT

## 2022-07-14 PROCEDURE — 85025 COMPLETE CBC W/AUTO DIFF WBC: CPT

## 2022-07-14 PROCEDURE — 82306 VITAMIN D 25 HYDROXY: CPT

## 2022-07-16 LAB — 25(OH)D3+25(OH)D2 SERPL-MCNC: 42.7 NG/ML (ref 30–100)

## 2022-07-18 ENCOUNTER — TELEPHONE (OUTPATIENT)
Dept: CARDIOLOGY | Facility: CLINIC | Age: 72
End: 2022-07-18

## 2022-07-18 NOTE — PROGRESS NOTES
Pt aware of lab results and recommendations to take OTC potassium for  a few days. Understanding voiced.

## 2022-07-18 NOTE — TELEPHONE ENCOUNTER
Received fax from Dr. Eliseo Jeffery for cardiac clearance for patient to have surgery in office. Patient is on aspirin and plavix and they are requesting to hold. According to our records, patient's last stenting was done on 11/02/06.         Fax [phone]

## 2023-02-02 ENCOUNTER — OFFICE VISIT (OUTPATIENT)
Dept: CARDIOLOGY | Facility: CLINIC | Age: 73
End: 2023-02-02
Payer: MEDICARE

## 2023-02-02 VITALS
HEART RATE: 60 BPM | WEIGHT: 229 LBS | DIASTOLIC BLOOD PRESSURE: 88 MMHG | BODY MASS INDEX: 32.06 KG/M2 | SYSTOLIC BLOOD PRESSURE: 144 MMHG | HEIGHT: 71 IN

## 2023-02-02 DIAGNOSIS — I25.9 IHD (ISCHEMIC HEART DISEASE): Primary | ICD-10-CM

## 2023-02-02 DIAGNOSIS — R01.1 MURMUR, CARDIAC: ICD-10-CM

## 2023-02-02 DIAGNOSIS — E78.00 HYPERCHOLESTEREMIA: ICD-10-CM

## 2023-02-02 DIAGNOSIS — I10 ESSENTIAL HYPERTENSION: ICD-10-CM

## 2023-02-02 DIAGNOSIS — E88.81 METABOLIC SYNDROME: ICD-10-CM

## 2023-02-02 DIAGNOSIS — I25.6 SILENT MYOCARDIAL ISCHEMIA: ICD-10-CM

## 2023-02-02 DIAGNOSIS — G47.30 SLEEP APNEA IN ADULT: ICD-10-CM

## 2023-02-02 DIAGNOSIS — R12 HEART BURN: ICD-10-CM

## 2023-02-02 DIAGNOSIS — R35.0 FREQUENCY OF MICTURITION: ICD-10-CM

## 2023-02-02 PROCEDURE — 99214 OFFICE O/P EST MOD 30 MIN: CPT | Performed by: INTERNAL MEDICINE

## 2023-02-02 RX ORDER — TADALAFIL 5 MG/1
5 TABLET ORAL DAILY
Qty: 90 TABLET | Refills: 3 | Status: SHIPPED | OUTPATIENT
Start: 2023-02-02

## 2023-02-02 RX ORDER — ATORVASTATIN CALCIUM 20 MG/1
20 TABLET, FILM COATED ORAL DAILY
Qty: 90 TABLET | Refills: 3 | Status: SHIPPED | OUTPATIENT
Start: 2023-02-02

## 2023-02-02 RX ORDER — LABETALOL 200 MG/1
200 TABLET, FILM COATED ORAL EVERY 12 HOURS SCHEDULED
Qty: 180 TABLET | Refills: 3 | Status: SHIPPED | OUTPATIENT
Start: 2023-02-02

## 2023-02-02 RX ORDER — CLOPIDOGREL BISULFATE 75 MG/1
75 TABLET ORAL DAILY
Qty: 90 TABLET | Refills: 3 | Status: SHIPPED | OUTPATIENT
Start: 2023-02-02

## 2023-02-02 RX ORDER — NIFEDIPINE 60 MG/1
60 TABLET, EXTENDED RELEASE ORAL DAILY
Qty: 90 TABLET | Refills: 3 | Status: SHIPPED | OUTPATIENT
Start: 2023-02-02

## 2023-02-02 RX ORDER — PANTOPRAZOLE SODIUM 40 MG/1
40 TABLET, DELAYED RELEASE ORAL DAILY
Qty: 90 TABLET | Refills: 3 | Status: SHIPPED | OUTPATIENT
Start: 2023-02-02

## 2023-02-02 RX ORDER — LOSARTAN POTASSIUM AND HYDROCHLOROTHIAZIDE 25; 100 MG/1; MG/1
1 TABLET ORAL DAILY
Qty: 90 TABLET | Refills: 3 | Status: SHIPPED | OUTPATIENT
Start: 2023-02-02

## 2023-02-02 NOTE — PROGRESS NOTES
Chief Complaint   Patient presents with   • Follow-up     For cardiac management, doing well   • Med Refill     Refills needed on cardiac meds. 90 days to [facility] pharm   • Labs     Most recent labs from July in chart.        CARDIAC COMPLAINTS  heartburn        Subjective   Edgardo Ortez is a 72 y.o. male came in today for his regular follow-up visit.  He has history of ischemic heart disease for which he has undergone stenting in 2005 and 2006.  His last cardiac work-up in 2019 was normal.  He came today with new symptoms of heartburn.  In the last few months he has been noticing some heartburn.  He thinks it occurs mostly after eating.  It occurs mostly during the daytime.  He is not sure whether this happens on exertion or not.  He denies having any melena or blood in the stool.  His last colonoscopy was few years ago.  He has never had a EGD.  His lab work done in July showed LDL of 98.  Rest of the labs are normal.  He denies having any increasing shortness of breath or palpitation.              Cardiac History  Past Surgical History:   Procedure Laterality Date   • CARDIAC CATHETERIZATION  2005    PCI of LAD & D1     • CARDIAC CATHETERIZATION  11/02/2006    75% LAD- 3.0 x 33 mm Cypher. 70% D1 2.5 x 18 mm Cypher Stent.     • CARDIOVASCULAR STRESS TEST  05/29/2007    10 Min 75% THR.Negative     • CARDIOVASCULAR STRESS TEST  05/18/2009    10 min, 98% tHR. Negative     • CARDIOVASCULAR STRESS TEST  07/12/2010    10 min. 83% THR. BP- 160/70. Anterior Ischemia.     • CARDIOVASCULAR STRESS TEST  09/08/2011    10 Min.85%THR. Negative     • CARDIOVASCULAR STRESS TEST  08/13/2013    10 Min,15 Secs. 89% THR. BP- 172/90. Negative.     • CARDIOVASCULAR STRESS TEST  04/13/2015    10 Min, 84% THR. BP- 184/78. Negative     • CARDIOVASCULAR STRESS TEST  02/18/2019    10 Min. 12.8 METS. 88% THR. BP- 172/80. EF 60%. Negative.   • CONVERTED (HISTORICAL) DUPLEX SCANS  04/13/2015    Carotid US- Normal.     • ECHO - CONVERTED   05/29/2007     EF 65%     • ECHO - CONVERTED  05/18/2009    EF >60%, Mild MR.     • ECHO - CONVERTED  07/12/2010    EF >60%, RVSP- 32.09mmHg.     • ECHO - CONVERTED  09/08/2011     EF 65%.     • ECHO - CONVERTED  08/13/2013    EF 65%. RVSp- 39 mmHg.     • ECHO - CONVERTED  04/13/2015    EF 65%     • ECHO - CONVERTED  02/18/2019    EF 60-65%. LA- 4.6 Cm. Mild MR. RVSP- 35 mmHg.   • US CAROTID UNILATERAL  08/13/2020    No sig stenosis       Current Outpatient Medications   Medication Sig Dispense Refill   • aspirin 81 MG EC tablet Take 81 mg by mouth Daily.     • atorvastatin (LIPITOR) 20 MG tablet Take 1 tablet by mouth Daily. 90 tablet 3   • clopidogrel (PLAVIX) 75 MG tablet Take 1 tablet by mouth Daily. 90 tablet 3   • labetalol (NORMODYNE) 200 MG tablet Take 1 tablet by mouth Every 12 (Twelve) Hours. 180 tablet 3   • losartan-hydrochlorothiazide (HYZAAR) 100-25 MG per tablet Take 1 tablet by mouth Daily. 90 tablet 3   • NIFEdipine XL (Procardia XL) 60 MG 24 hr tablet Take 1 tablet by mouth Daily. 90 tablet 3   • tadalafil (CIALIS) 5 MG tablet Take 1 tablet by mouth Daily. 90 tablet 3   • pantoprazole (Protonix) 40 MG EC tablet Take 1 tablet by mouth Daily. 90 tablet 3     No current facility-administered medications for this visit.       Allergies  :  Patient has no known allergies.       Past Medical History:   Diagnosis Date   • H/O prostate biopsy     negative   • Hypercholesterolemia    • Hypertension    • IHD (ischemic heart disease)     S/P stenting of LAD & diagonal   • MR (mitral regurgitation)     mild   • pulse oximetry 07/07/2010    Overnight oximetry- Abnormal, sleep study ordered, pt. wants to lose wt. first.       Social History     Socioeconomic History   • Marital status:    Tobacco Use   • Smoking status: Never   • Smokeless tobacco: Never   Substance and Sexual Activity   • Alcohol use: Yes     Comment: states he drinks beer or scotch every other day   • Drug use: No       Family History  "  Problem Relation Age of Onset   • Heart disease Mother    • Heart disease Father        Review of Systems   Constitutional: Negative for decreased appetite and malaise/fatigue.   HENT: Negative for congestion and sore throat.    Eyes: Negative for blurred vision, double vision and visual disturbance.   Cardiovascular: Negative for chest pain and palpitations.   Respiratory: Negative for shortness of breath and snoring.    Endocrine: Negative for cold intolerance and heat intolerance.   Hematologic/Lymphatic: Negative for adenopathy. Does not bruise/bleed easily.   Skin: Negative for itching, nail changes and skin cancer.   Musculoskeletal: Negative for arthritis and myalgias.   Gastrointestinal: Positive for heartburn. Negative for abdominal pain and dysphagia.   Genitourinary: Negative for bladder incontinence and frequency.   Neurological: Negative for dizziness, seizures and vertigo.   Psychiatric/Behavioral: Negative for altered mental status.   Allergic/Immunologic: Negative for environmental allergies and hives.       Diabetes- No  Thyroid- normal    Objective     /88   Pulse 60   Ht 180.3 cm (71\")   Wt 104 kg (229 lb)   BMI 31.94 kg/m²     Vitals and nursing note reviewed.   Constitutional:       Appearance: Healthy appearance. Not in distress.   Eyes:      Conjunctiva/sclera: Conjunctivae normal.      Pupils: Pupils are equal, round, and reactive to light.   HENT:      Head: Normocephalic.   Pulmonary:      Effort: Pulmonary effort is normal.      Breath sounds: Normal breath sounds.   Cardiovascular:      PMI at left midclavicular line. Normal rate. Regular rhythm.      Murmurs: There is a grade 4/6 high frequency blowing holosystolic murmur at the apex.   Abdominal:      General: Bowel sounds are normal.      Palpations: Abdomen is soft.   Musculoskeletal: Normal range of motion.      Cervical back: Normal range of motion and neck supple. Skin:     General: Skin is warm and dry.   Neurological: "      Mental Status: Alert, oriented to person, place, and time and oriented to person, place and time.       Procedures            @ASSESSMENT/PLAN@  BMI is >= 30 and <35. (Class 1 Obesity). The following options were offered after discussion;: nutrition counseling/recommendations     Diagnoses and all orders for this visit:    1. IHD (ischemic heart disease) (Primary)  -     Stress Test With Myocardial Perfusion One Day; Future  -     Adult Transthoracic Echo Complete W/ Cont if Necessary Per Protocol; Future  -     clopidogrel (PLAVIX) 75 MG tablet; Take 1 tablet by mouth Daily.  Dispense: 90 tablet; Refill: 3    2. Essential hypertension  -     Comprehensive Metabolic Panel; Future  -     losartan-hydrochlorothiazide (HYZAAR) 100-25 MG per tablet; Take 1 tablet by mouth Daily.  Dispense: 90 tablet; Refill: 3  -     labetalol (NORMODYNE) 200 MG tablet; Take 1 tablet by mouth Every 12 (Twelve) Hours.  Dispense: 180 tablet; Refill: 3  -     NIFEdipine XL (Procardia XL) 60 MG 24 hr tablet; Take 1 tablet by mouth Daily.  Dispense: 90 tablet; Refill: 3    3. Hypercholesteremia  -     Lipid Panel; Future  -     atorvastatin (LIPITOR) 20 MG tablet; Take 1 tablet by mouth Daily.  Dispense: 90 tablet; Refill: 3    4. Metabolic syndrome  -     CBC & Differential; Future    5. Sleep apnea in adult    6. Heart burn  -     Stress Test With Myocardial Perfusion One Day; Future  -     H.pylori,IgG / IgA Antibodies; Future  -     pantoprazole (Protonix) 40 MG EC tablet; Take 1 tablet by mouth Daily.  Dispense: 90 tablet; Refill: 3    7. Murmur, cardiac  -     Adult Transthoracic Echo Complete W/ Cont if Necessary Per Protocol; Future    8. Silent myocardial ischemia  -     Stress Test With Myocardial Perfusion One Day; Future    9. Frequency of micturition   -     tadalafil (CIALIS) 5 MG tablet; Take 1 tablet by mouth Daily.  Dispense: 90 tablet; Refill: 3       At baseline his heart rate is stable.  His blood pressure is  borderline elevated.  His BMI has gone up.  He has gained about 6 pounds.  His clinical examination is unremarkable other than the systolic murmur at the mitral area which seems to be little louder    Regarding his ischemic heart disease, his last cardiac work-up was almost 4 years ago.  He is now having new symptoms of heartburn which could be related to GERD but cannot rule out cardiac.  I scheduled him to undergo a stress test to evaluate for silent ischemia.  This to check his effort tolerance, heart rate and blood pressure response and to rule out any ischemia.  Meanwhile continue Plavix and aspirin.    Regarding his hypertension, it is controlled with Hyzaar, labetalol and Procardia XL.  Continue the same.  Need to recheck his electrolytes and renal function    Regarding his hypercholesterolemia, he is on Lipitor.  Continue the same.  Need to recheck his lipid profile and LFT    Regarding his metabolic syndrome, I talked to him again about cutting down on the carbohydrate intake.  He is to check his blood count    Regarding sleep apnea, he does use his CPAP.    Regarding his heartburn, it is little worrisome.  We will also check for H. pylori.  If it is abnormal then he may need an EGD.  I also started him on Protonix at 40 mg once a day.    Regarding the cardiac murmur which seems to be little louder, we will get an echocardiogram to reevaluate the valvular structures and the LV function    Regarding his frequent micturition, he is doing better with Cialis so continue the same    Regarding advanced directive, he does have a living will and power of  but it is almost 15 years old.  I talked to him about it and gave him a booklet regarding that    Based on the results, further recommendations will be made.                  Electronically signed by Ovidio Warren MD February 2, 2023 14:13 EST

## 2023-02-07 ENCOUNTER — LAB (OUTPATIENT)
Dept: LAB | Facility: HOSPITAL | Age: 73
End: 2023-02-07
Payer: MEDICARE

## 2023-02-07 DIAGNOSIS — E78.00 HYPERCHOLESTEREMIA: ICD-10-CM

## 2023-02-07 DIAGNOSIS — E88.81 METABOLIC SYNDROME: ICD-10-CM

## 2023-02-07 DIAGNOSIS — I10 ESSENTIAL HYPERTENSION: ICD-10-CM

## 2023-02-07 LAB
ALBUMIN SERPL-MCNC: 4.3 G/DL (ref 3.5–5.2)
ALBUMIN/GLOB SERPL: 1.5 G/DL
ALP SERPL-CCNC: 79 U/L (ref 39–117)
ALT SERPL W P-5'-P-CCNC: 17 U/L (ref 1–41)
ANION GAP SERPL CALCULATED.3IONS-SCNC: 9.9 MMOL/L (ref 5–15)
AST SERPL-CCNC: 17 U/L (ref 1–40)
BASOPHILS # BLD AUTO: 0.06 10*3/MM3 (ref 0–0.2)
BASOPHILS NFR BLD AUTO: 1 % (ref 0–1.5)
BILIRUB SERPL-MCNC: 0.6 MG/DL (ref 0–1.2)
BUN SERPL-MCNC: 18 MG/DL (ref 8–23)
BUN/CREAT SERPL: 16.5 (ref 7–25)
CALCIUM SPEC-SCNC: 9.7 MG/DL (ref 8.6–10.5)
CHLORIDE SERPL-SCNC: 101 MMOL/L (ref 98–107)
CHOLEST SERPL-MCNC: 160 MG/DL (ref 0–200)
CO2 SERPL-SCNC: 28.1 MMOL/L (ref 22–29)
CREAT SERPL-MCNC: 1.09 MG/DL (ref 0.76–1.27)
DEPRECATED RDW RBC AUTO: 42 FL (ref 37–54)
EGFRCR SERPLBLD CKD-EPI 2021: 72.1 ML/MIN/1.73
EOSINOPHIL # BLD AUTO: 0.55 10*3/MM3 (ref 0–0.4)
EOSINOPHIL NFR BLD AUTO: 9.1 % (ref 0.3–6.2)
ERYTHROCYTE [DISTWIDTH] IN BLOOD BY AUTOMATED COUNT: 13.2 % (ref 12.3–15.4)
GLOBULIN UR ELPH-MCNC: 2.8 GM/DL
GLUCOSE SERPL-MCNC: 95 MG/DL (ref 65–99)
HCT VFR BLD AUTO: 40.1 % (ref 37.5–51)
HDLC SERPL-MCNC: 49 MG/DL (ref 40–60)
HGB BLD-MCNC: 13.4 G/DL (ref 13–17.7)
IMM GRANULOCYTES # BLD AUTO: 0.02 10*3/MM3 (ref 0–0.05)
IMM GRANULOCYTES NFR BLD AUTO: 0.3 % (ref 0–0.5)
LDLC SERPL CALC-MCNC: 91 MG/DL (ref 0–100)
LDLC/HDLC SERPL: 1.82 {RATIO}
LYMPHOCYTES # BLD AUTO: 1.38 10*3/MM3 (ref 0.7–3.1)
LYMPHOCYTES NFR BLD AUTO: 22.8 % (ref 19.6–45.3)
MCH RBC QN AUTO: 29.2 PG (ref 26.6–33)
MCHC RBC AUTO-ENTMCNC: 33.4 G/DL (ref 31.5–35.7)
MCV RBC AUTO: 87.4 FL (ref 79–97)
MONOCYTES # BLD AUTO: 0.77 10*3/MM3 (ref 0.1–0.9)
MONOCYTES NFR BLD AUTO: 12.7 % (ref 5–12)
NEUTROPHILS NFR BLD AUTO: 3.28 10*3/MM3 (ref 1.7–7)
NEUTROPHILS NFR BLD AUTO: 54.1 % (ref 42.7–76)
NRBC BLD AUTO-RTO: 0 /100 WBC (ref 0–0.2)
PLATELET # BLD AUTO: 278 10*3/MM3 (ref 140–450)
PMV BLD AUTO: 9.5 FL (ref 6–12)
POTASSIUM SERPL-SCNC: 3.3 MMOL/L (ref 3.5–5.2)
PROT SERPL-MCNC: 7.1 G/DL (ref 6–8.5)
RBC # BLD AUTO: 4.59 10*6/MM3 (ref 4.14–5.8)
SODIUM SERPL-SCNC: 139 MMOL/L (ref 136–145)
TRIGL SERPL-MCNC: 110 MG/DL (ref 0–150)
VLDLC SERPL-MCNC: 20 MG/DL (ref 5–40)
WBC NRBC COR # BLD: 6.06 10*3/MM3 (ref 3.4–10.8)

## 2023-02-07 PROCEDURE — 80053 COMPREHEN METABOLIC PANEL: CPT | Performed by: INTERNAL MEDICINE

## 2023-02-07 PROCEDURE — 86677 HELICOBACTER PYLORI ANTIBODY: CPT | Performed by: INTERNAL MEDICINE

## 2023-02-07 PROCEDURE — 80061 LIPID PANEL: CPT | Performed by: INTERNAL MEDICINE

## 2023-02-07 PROCEDURE — 85025 COMPLETE CBC W/AUTO DIFF WBC: CPT | Performed by: INTERNAL MEDICINE

## 2023-02-09 NOTE — PROGRESS NOTES
Pt aware of  lab results and recommendations to increase his potassium rich foods, otherwise continue current regimen.

## 2023-02-21 ENCOUNTER — HOSPITAL ENCOUNTER (OUTPATIENT)
Dept: CARDIOLOGY | Facility: HOSPITAL | Age: 73
Discharge: HOME | End: 2023-02-21
Payer: MEDICARE

## 2023-02-21 VITALS — HEIGHT: 71 IN | BODY MASS INDEX: 32.1 KG/M2 | WEIGHT: 229.28 LBS

## 2023-02-21 DIAGNOSIS — R12 HEART BURN: ICD-10-CM

## 2023-02-21 DIAGNOSIS — R01.1 MURMUR, CARDIAC: ICD-10-CM

## 2023-02-21 DIAGNOSIS — I25.9 IHD (ISCHEMIC HEART DISEASE): ICD-10-CM

## 2023-02-21 DIAGNOSIS — I25.6 SILENT MYOCARDIAL ISCHEMIA: ICD-10-CM

## 2023-02-21 LAB
AORTIC DIMENSIONLESS INDEX: 0.99 (DI)
BH CV ECHO MEAS - ACS: 2.11 CM
BH CV ECHO MEAS - AO MAX PG: 7.8 MMHG
BH CV ECHO MEAS - AO MEAN PG: 3.9 MMHG
BH CV ECHO MEAS - AO ROOT DIAM: 3.6 CM
BH CV ECHO MEAS - AO V2 MAX: 140.1 CM/SEC
BH CV ECHO MEAS - AO V2 VTI: 32.3 CM
BH CV ECHO MEAS - EDV(CUBED): 112.1 ML
BH CV ECHO MEAS - EF_3D-VOL: 62 %
BH CV ECHO MEAS - ESV(CUBED): 34.2 ML
BH CV ECHO MEAS - FS: 32.7 %
BH CV ECHO MEAS - IVS/LVPW: 1.13 CM
BH CV ECHO MEAS - IVSD: 1.23 CM
BH CV ECHO MEAS - LA DIMENSION: 4 CM
BH CV ECHO MEAS - LAT PEAK E' VEL: 8.8 CM/SEC
BH CV ECHO MEAS - LV MASS(C)D: 209.6 GRAMS
BH CV ECHO MEAS - LV MAX PG: 7.8 MMHG
BH CV ECHO MEAS - LV MEAN PG: 3.7 MMHG
BH CV ECHO MEAS - LV V1 MAX: 139.4 CM/SEC
BH CV ECHO MEAS - LV V1 VTI: 33.5 CM
BH CV ECHO MEAS - LVIDD: 4.8 CM
BH CV ECHO MEAS - LVIDS: 3.2 CM
BH CV ECHO MEAS - LVPWD: 1.09 CM
BH CV ECHO MEAS - MED PEAK E' VEL: 6.3 CM/SEC
BH CV ECHO MEAS - MV A MAX VEL: 60.4 CM/SEC
BH CV ECHO MEAS - MV DEC SLOPE: 543.8 CM/SEC2
BH CV ECHO MEAS - MV DEC TIME: 0.24 MSEC
BH CV ECHO MEAS - MV E MAX VEL: 86.9 CM/SEC
BH CV ECHO MEAS - MV E/A: 1.44
BH CV ECHO MEAS - MV MAX PG: 3.8 MMHG
BH CV ECHO MEAS - MV MEAN PG: 1.67 MMHG
BH CV ECHO MEAS - MV P1/2T: 54.1 MSEC
BH CV ECHO MEAS - MV V2 VTI: 37 CM
BH CV ECHO MEAS - MVA(P1/2T): 4.1 CM2
BH CV ECHO MEAS - PA V2 MAX: 109.2 CM/SEC
BH CV ECHO MEAS - RAP SYSTOLE: 8 MMHG
BH CV ECHO MEAS - RV MAX PG: 1.55 MMHG
BH CV ECHO MEAS - RV V1 MAX: 62.2 CM/SEC
BH CV ECHO MEAS - RV V1 VTI: 11.5 CM
BH CV ECHO MEAS - RVDD: 3.5 CM
BH CV ECHO MEAS - RVSP: 28.3 MMHG
BH CV ECHO MEAS - TAPSE (>1.6): 2.3 CM
BH CV ECHO MEAS - TR MAX PG: 20.3 MMHG
BH CV ECHO MEAS - TR MAX VEL: 225.1 CM/SEC
BH CV ECHO MEASUREMENTS AVERAGE E/E' RATIO: 11.51
BH CV REST NUCLEAR ISOTOPE DOSE: 10 MCI
BH CV STRESS NUCLEAR ISOTOPE DOSE: 30 MCI
BH CV STRESS RECOVERY BP: NORMAL MMHG
BH CV STRESS RECOVERY HR: 75 BPM
BH CV XLRA - TDI S': 13.7 CM/SEC
LV EF NUC BP: 57 %
MAXIMAL PREDICTED HEART RATE: 148 BPM
MAXIMAL PREDICTED HEART RATE: 148 BPM
PERCENT MAX PREDICTED HR: 90.54 %
SINUS: 3.2 CM
STRESS BASELINE BP: NORMAL MMHG
STRESS BASELINE HR: 55 BPM
STRESS PERCENT HR: 107 %
STRESS POST ESTIMATED WORKLOAD: 13.4 METS
STRESS POST EXERCISE DUR MIN: 11 MIN
STRESS POST PEAK BP: NORMAL MMHG
STRESS POST PEAK HR: 134 BPM
STRESS TARGET HR: 126 BPM
STRESS TARGET HR: 126 BPM

## 2023-02-21 PROCEDURE — 78452 HT MUSCLE IMAGE SPECT MULT: CPT | Performed by: INTERNAL MEDICINE

## 2023-02-21 PROCEDURE — A9500 TC99M SESTAMIBI: HCPCS | Performed by: INTERNAL MEDICINE

## 2023-02-21 PROCEDURE — 93017 CV STRESS TEST TRACING ONLY: CPT

## 2023-02-21 PROCEDURE — 0 TECHNETIUM SESTAMIBI: Performed by: INTERNAL MEDICINE

## 2023-02-21 PROCEDURE — 93306 TTE W/DOPPLER COMPLETE: CPT | Performed by: INTERNAL MEDICINE

## 2023-02-21 PROCEDURE — 78452 HT MUSCLE IMAGE SPECT MULT: CPT

## 2023-02-21 PROCEDURE — 93018 CV STRESS TEST I&R ONLY: CPT | Performed by: INTERNAL MEDICINE

## 2023-02-21 PROCEDURE — 93306 TTE W/DOPPLER COMPLETE: CPT

## 2023-02-21 RX ADMIN — TECHNETIUM TC 99M SESTAMIBI 1 DOSE: 1 INJECTION INTRAVENOUS at 11:37

## 2023-02-21 RX ADMIN — TECHNETIUM TC 99M SESTAMIBI 1 DOSE: 1 INJECTION INTRAVENOUS at 08:14

## 2023-08-14 ENCOUNTER — TRANSCRIBE ORDERS (OUTPATIENT)
Dept: LAB | Facility: HOSPITAL | Age: 73
End: 2023-08-14
Payer: MEDICARE

## 2023-08-14 ENCOUNTER — LAB (OUTPATIENT)
Dept: LAB | Facility: HOSPITAL | Age: 73
End: 2023-08-14
Payer: MEDICARE

## 2023-08-14 DIAGNOSIS — R21 RASH AND OTHER NONSPECIFIC SKIN ERUPTION: Primary | ICD-10-CM

## 2023-08-14 DIAGNOSIS — R21 RASH AND OTHER NONSPECIFIC SKIN ERUPTION: ICD-10-CM

## 2023-08-14 LAB
BILIRUB UR QL STRIP: ABNORMAL
CLARITY UR: CLEAR
COLOR UR: ABNORMAL
ERYTHROCYTE [SEDIMENTATION RATE] IN BLOOD: 13 MM/HR (ref 0–20)
GLUCOSE UR STRIP-MCNC: NEGATIVE MG/DL
HGB UR QL STRIP.AUTO: NEGATIVE
KETONES UR QL STRIP: ABNORMAL
LEUKOCYTE ESTERASE UR QL STRIP.AUTO: ABNORMAL
NITRITE UR QL STRIP: NEGATIVE
PH UR STRIP.AUTO: 6.5 [PH] (ref 5–8)
PROT UR QL STRIP: ABNORMAL
SP GR UR STRIP: 1.02 (ref 1–1.03)
UROBILINOGEN UR QL STRIP: ABNORMAL

## 2023-08-14 PROCEDURE — 85652 RBC SED RATE AUTOMATED: CPT | Performed by: DERMATOLOGY

## 2023-08-14 PROCEDURE — 81003 URINALYSIS AUTO W/O SCOPE: CPT | Performed by: DERMATOLOGY

## 2023-08-16 ENCOUNTER — OFFICE VISIT (OUTPATIENT)
Dept: CARDIOLOGY | Facility: CLINIC | Age: 73
End: 2023-08-16
Payer: MEDICARE

## 2023-08-16 VITALS
HEART RATE: 60 BPM | HEIGHT: 71 IN | BODY MASS INDEX: 32.48 KG/M2 | DIASTOLIC BLOOD PRESSURE: 80 MMHG | SYSTOLIC BLOOD PRESSURE: 130 MMHG | WEIGHT: 232 LBS

## 2023-08-16 DIAGNOSIS — I10 ESSENTIAL HYPERTENSION: ICD-10-CM

## 2023-08-16 DIAGNOSIS — R35.0 FREQUENCY OF MICTURITION: ICD-10-CM

## 2023-08-16 DIAGNOSIS — R12 HEART BURN: ICD-10-CM

## 2023-08-16 DIAGNOSIS — I25.9 IHD (ISCHEMIC HEART DISEASE): Primary | ICD-10-CM

## 2023-08-16 DIAGNOSIS — G47.30 SLEEP APNEA IN ADULT: ICD-10-CM

## 2023-08-16 DIAGNOSIS — E88.81 METABOLIC SYNDROME: ICD-10-CM

## 2023-08-16 DIAGNOSIS — E78.00 HYPERCHOLESTEREMIA: ICD-10-CM

## 2023-08-16 PROCEDURE — 99214 OFFICE O/P EST MOD 30 MIN: CPT | Performed by: INTERNAL MEDICINE

## 2023-08-16 PROCEDURE — 3079F DIAST BP 80-89 MM HG: CPT | Performed by: INTERNAL MEDICINE

## 2023-08-16 PROCEDURE — 1159F MED LIST DOCD IN RCRD: CPT | Performed by: INTERNAL MEDICINE

## 2023-08-16 PROCEDURE — 3075F SYST BP GE 130 - 139MM HG: CPT | Performed by: INTERNAL MEDICINE

## 2023-08-16 PROCEDURE — 1160F RVW MEDS BY RX/DR IN RCRD: CPT | Performed by: INTERNAL MEDICINE

## 2023-08-16 RX ORDER — ATORVASTATIN CALCIUM 20 MG/1
20 TABLET, FILM COATED ORAL DAILY
Qty: 90 TABLET | Refills: 3 | Status: SHIPPED | OUTPATIENT
Start: 2023-08-16

## 2023-08-16 RX ORDER — ASPIRIN 81 MG/1
81 TABLET ORAL DAILY
Qty: 90 TABLET | Refills: 3 | Status: SHIPPED | OUTPATIENT
Start: 2023-08-16

## 2023-08-16 RX ORDER — LABETALOL 200 MG/1
200 TABLET, FILM COATED ORAL EVERY 12 HOURS SCHEDULED
Qty: 180 TABLET | Refills: 3 | Status: SHIPPED | OUTPATIENT
Start: 2023-08-16

## 2023-08-16 RX ORDER — LOSARTAN POTASSIUM AND HYDROCHLOROTHIAZIDE 25; 100 MG/1; MG/1
1 TABLET ORAL DAILY
Qty: 90 TABLET | Refills: 3 | Status: SHIPPED | OUTPATIENT
Start: 2023-08-16

## 2023-08-16 RX ORDER — PANTOPRAZOLE SODIUM 40 MG/1
40 TABLET, DELAYED RELEASE ORAL DAILY
Qty: 90 TABLET | Refills: 3 | Status: SHIPPED | OUTPATIENT
Start: 2023-08-16

## 2023-08-16 RX ORDER — CLOPIDOGREL BISULFATE 75 MG/1
75 TABLET ORAL DAILY
Qty: 90 TABLET | Refills: 3 | Status: SHIPPED | OUTPATIENT
Start: 2023-08-16

## 2023-08-16 RX ORDER — TADALAFIL 5 MG/1
5 TABLET ORAL DAILY
Qty: 90 TABLET | Refills: 3 | Status: SHIPPED | OUTPATIENT
Start: 2023-08-16

## 2023-08-16 RX ORDER — NIFEDIPINE 60 MG/1
60 TABLET, EXTENDED RELEASE ORAL DAILY
Qty: 90 TABLET | Refills: 3 | Status: SHIPPED | OUTPATIENT
Start: 2023-08-16

## 2023-08-16 NOTE — LETTER
August 16, 2023       No Recipients    Patient: Edgardo Ortez   YOB: 1950   Date of Visit: 8/16/2023     Dear Edgardo Espinal MD:       Thank you for referring Edgardo Ortez to me for evaluation. Below are the relevant portions of my assessment and plan of care.    If you have questions, please do not hesitate to call me. I look forward to following Edgardo along with you.         Sincerely,        Ovidio Warren MD        CC:   No Recipients    Ovidio Warren MD  08/16/23 1402  Sign when Signing Visit  Chief Complaint   Patient presents with    Follow-up     For cardiac management, doing good.     Rash     Rash on his leg, Dr Jeffery following. He ordered labs, results in his chart.     labs     Has only had the labs we have in the chart.     Med Refill     Refills needed on cardiac meds. 90 days to [facility].        CARDIAC COMPLAINTS  Cardiac management        Subjective  Edgardo Ortez is a 73 y.o. male came in today for his regular follow-up visit.  He has history of ischemic heart disease, hypertension, hypercholesterolemia.  He has undergone PCI in 2005 and 2006.  During his last visit he was having a lot of heartburn so underwent a stress test which showed good effort tolerance mild hypertensive blood pressure response with no evidence of ischemia.  He also underwent echocardiogram which showed mild left atrial enlargement with a normal EF.  He is lab work showed well-controlled cholesterol with the LDL of 91.  His blood count was normal his renal function was normal.  His potassium was mildly low.  His H. pylori was negative.  He came today with no new cardiac symptoms.  He apparently has been having some rash in his leg.  He was seen by the dermatologist.  [place] it could be secondary to mild venous stasis.  He denies having any chest pain.  He denies having any heartburn              Cardiac History  Past Surgical History:   Procedure Laterality Date     CARDIAC CATHETERIZATION  2005    PCI of LAD & D1      CARDIAC CATHETERIZATION  11/02/2006    75% LAD- 3.0 x 33 mm Cypher. 70% D1 2.5 x 18 mm Cypher Stent.      CARDIOVASCULAR STRESS TEST  05/29/2007    10 Min 75% THR.Negative      CARDIOVASCULAR STRESS TEST  05/18/2009    10 min, 98% tHR. Negative      CARDIOVASCULAR STRESS TEST  07/12/2010    10 min. 83% THR. BP- 160/70. Anterior Ischemia.      CARDIOVASCULAR STRESS TEST  09/08/2011    10 Min.85%THR. Negative      CARDIOVASCULAR STRESS TEST  08/13/2013    10 Min,15 Secs. 89% THR. BP- 172/90. Negative.      CARDIOVASCULAR STRESS TEST  04/13/2015    10 Min, 84% THR. BP- 184/78. Negative      CARDIOVASCULAR STRESS TEST  02/18/2019    10 Min. 12.8 METS. 88% THR. BP- 172/80. EF 60%. Negative.    CARDIOVASCULAR STRESS TEST  02/21/2023    11 Min. 13.4 METS. 90% THR. 173/74. EF 57%. Negative    CONVERTED (HISTORICAL) DUPLEX SCANS  04/13/2015    Carotid US- Normal.      ECHO - CONVERTED  05/29/2007     EF 65%      ECHO - CONVERTED  05/18/2009    EF >60%, Mild MR.      ECHO - CONVERTED  07/12/2010    EF >60%, RVSP- 32.09mmHg.      ECHO - CONVERTED  09/08/2011     EF 65%.      ECHO - CONVERTED  08/13/2013    EF 65%. RVSp- 39 mmHg.      ECHO - CONVERTED  04/13/2015    EF 65%      ECHO - CONVERTED  02/18/2019    EF 60-65%. LA- 4.6 Cm. Mild MR. RVSP- 35 mmHg.    ECHO - CONVERTED  02/21/2023    EF 65%. LA- 4.0. Mild MR. RVSP- 28 mmHg    US CAROTID UNILATERAL  08/13/2020    No sig stenosis       Current Outpatient Medications   Medication Sig Dispense Refill    aspirin 81 MG EC tablet Take 1 tablet by mouth Daily. 90 tablet 3    atorvastatin (LIPITOR) 20 MG tablet Take 1 tablet by mouth Daily. 90 tablet 3    clopidogrel (PLAVIX) 75 MG tablet Take 1 tablet by mouth Daily. 90 tablet 3    labetalol (NORMODYNE) 200 MG tablet Take 1 tablet by mouth Every 12 (Twelve) Hours. 180 tablet 3    losartan-hydrochlorothiazide (HYZAAR) 100-25 MG per tablet Take 1 tablet  by mouth Daily. 90 tablet 3    NIFEdipine XL (Procardia XL) 60 MG 24 hr tablet Take 1 tablet by mouth Daily. 90 tablet 3    pantoprazole (Protonix) 40 MG EC tablet Take 1 tablet by mouth Daily. 90 tablet 3    tadalafil (CIALIS) 5 MG tablet Take 1 tablet by mouth Daily. 90 tablet 3     No current facility-administered medications for this visit.       Allergies  :  Patient has no known allergies.       Past Medical History:   Diagnosis Date    H/O prostate biopsy     negative    Hypercholesterolemia     Hypertension     IHD (ischemic heart disease)     S/P stenting of LAD & diagonal    MR (mitral regurgitation)     mild    pulse oximetry 07/07/2010    Overnight oximetry- Abnormal, sleep study ordered, pt. wants to lose wt. first.       Social History     Socioeconomic History    Marital status:    Tobacco Use    Smoking status: Never    Smokeless tobacco: Never   Substance and Sexual Activity    Alcohol use: Yes     Comment: states he drinks beer or scotch every other day    Drug use: No       Family History   Problem Relation Age of Onset    Heart disease Mother     Heart disease Father        Review of Systems   Constitutional: Negative for decreased appetite and malaise/fatigue.   HENT:  Negative for congestion and sore throat.    Eyes:  Negative for blurred vision, double vision and visual disturbance.   Respiratory:  Negative for shortness of breath and snoring.    Endocrine: Negative for cold intolerance and heat intolerance.   Hematologic/Lymphatic: Negative for adenopathy. Does not bruise/bleed easily.   Skin:  Positive for rash. Negative for itching, nail changes and skin cancer.   Musculoskeletal:  Negative for arthritis and myalgias.   Gastrointestinal:  Negative for abdominal pain, dysphagia and heartburn.   Genitourinary:  Negative for bladder incontinence and frequency.   Neurological:  Negative for dizziness, seizures and vertigo.   Psychiatric/Behavioral:  Negative for altered  "mental status.    Allergic/Immunologic: Negative for environmental allergies and hives.     Diabetes- No  Thyroid- normal    Objective    /80   Pulse 60   Ht 180.3 cm (71\")   Wt 105 kg (232 lb)   BMI 32.36 kg/mý     Vitals and nursing note reviewed.   Constitutional:       Appearance: Healthy appearance. Not in distress.   Eyes:      Conjunctiva/sclera: Conjunctivae normal.      Pupils: Pupils are equal, round, and reactive to light.   HENT:      Head: Normocephalic.   Pulmonary:      Effort: Pulmonary effort is normal.      Breath sounds: Normal breath sounds.   Cardiovascular:      PMI at left midclavicular line. Normal rate. Regular rhythm.   Abdominal:      General: Bowel sounds are normal.      Palpations: Abdomen is soft.   Musculoskeletal: Normal range of motion.      Cervical back: Normal range of motion and neck supple. Skin:     General: Skin is warm and dry.   Neurological:      Mental Status: Alert, oriented to person, place, and time and oriented to person, place and time.   Procedures            @ASSESSMENT/PLAN@        Diagnoses and all orders for this visit:    1. IHD (ischemic heart disease) (Primary)  -     clopidogrel (PLAVIX) 75 MG tablet; Take 1 tablet by mouth Daily.  Dispense: 90 tablet; Refill: 3  -     aspirin 81 MG EC tablet; Take 1 tablet by mouth Daily.  Dispense: 90 tablet; Refill: 3  -     CBC & Differential; Future    2. Essential hypertension  -     losartan-hydrochlorothiazide (HYZAAR) 100-25 MG per tablet; Take 1 tablet by mouth Daily.  Dispense: 90 tablet; Refill: 3  -     labetalol (NORMODYNE) 200 MG tablet; Take 1 tablet by mouth Every 12 (Twelve) Hours.  Dispense: 180 tablet; Refill: 3  -     NIFEdipine XL (Procardia XL) 60 MG 24 hr tablet; Take 1 tablet by mouth Daily.  Dispense: 90 tablet; Refill: 3  -     Comprehensive Metabolic Panel; Future    3. Hypercholesteremia  -     atorvastatin (LIPITOR) 20 MG tablet; Take 1 tablet by mouth Daily.  Dispense: 90 tablet; " Refill: 3  -     Lipid Panel; Future    4. Metabolic syndrome    5. Sleep apnea in adult    6. Frequency of micturition   -     tadalafil (CIALIS) 5 MG tablet; Take 1 tablet by mouth Daily.  Dispense: 90 tablet; Refill: 3  -     PSA DIAGNOSTIC; Future    7. Heart burn  -     pantoprazole (Protonix) 40 MG EC tablet; Take 1 tablet by mouth Daily.  Dispense: 90 tablet; Refill: 3       At baseline his heart rate and blood pressure appears stable.  His initial blood pressure was mildly elevated but after checking it again it came down to normal.  His BMI is still around 32.  His cardiovascular examination is otherwise unremarkable    Regarding his ischemic heart disease, he has undergone stenting in the past, no anginal symptoms noted.  His stress test was negative.  Continue Plavix and aspirin.  He need his blood count checked again    Regarding his hypertension, it is now well controlled with a combination of Hyzaar, Procardia XL and labetalol.  Continue the same.  Need to recheck his renal function and electrolytes    Regarding his hypercholesterolemia, he is on Lipitor at 20 mg.  Continue the same.  No myalgia noted.  Recheck the lipid panel.     Regarding the metabolic syndrome, had a long talk with him again about it.  I explained to him about intermittent fasting diet which he is going to try    Regarding his sleep apnea, he does use CPAP.  Continue to monitor    Regarding his frequent micturition, we will recheck his PSA level also.    Regarding his heartburn, his H. pylori antibody was negative.  Pantoprazole is helping him.  Continue the same.                        Electronically signed by Ovidio Warren MD August 16, 2023 13:58 EDT

## 2023-08-16 NOTE — PROGRESS NOTES
Chief Complaint   Patient presents with    Follow-up     For cardiac management, doing good.     Rash     Rash on his leg, Dr Jeffery following. He ordered labs, results in his chart.     labs     Has only had the labs we have in the chart.     Med Refill     Refills needed on cardiac meds. 90 days to [facility].        CARDIAC COMPLAINTS  Cardiac management        Subjective   Edgardo Ortez is a 73 y.o. male came in today for his regular follow-up visit.  He has history of ischemic heart disease, hypertension, hypercholesterolemia.  He has undergone PCI in 2005 and 2006.  During his last visit he was having a lot of heartburn so underwent a stress test which showed good effort tolerance mild hypertensive blood pressure response with no evidence of ischemia.  He also underwent echocardiogram which showed mild left atrial enlargement with a normal EF.  He is lab work showed well-controlled cholesterol with the LDL of 91.  His blood count was normal his renal function was normal.  His potassium was mildly low.  His H. pylori was negative.  He came today with no new cardiac symptoms.  He apparently has been having some rash in his leg.  He was seen by the dermatologist.  [place] it could be secondary to mild venous stasis.  He denies having any chest pain.  He denies having any heartburn              Cardiac History  Past Surgical History:   Procedure Laterality Date    CARDIAC CATHETERIZATION  2005    PCI of LAD & D1      CARDIAC CATHETERIZATION  11/02/2006    75% LAD- 3.0 x 33 mm Cypher. 70% D1 2.5 x 18 mm Cypher Stent.      CARDIOVASCULAR STRESS TEST  05/29/2007    10 Min 75% THR.Negative      CARDIOVASCULAR STRESS TEST  05/18/2009    10 min, 98% tHR. Negative      CARDIOVASCULAR STRESS TEST  07/12/2010    10 min. 83% THR. BP- 160/70. Anterior Ischemia.      CARDIOVASCULAR STRESS TEST  09/08/2011    10 Min.85%THR. Negative      CARDIOVASCULAR STRESS TEST  08/13/2013    10 Min,15 Secs. 89% THR. BP- 172/90.  Negative.      CARDIOVASCULAR STRESS TEST  04/13/2015    10 Min, 84% THR. BP- 184/78. Negative      CARDIOVASCULAR STRESS TEST  02/18/2019    10 Min. 12.8 METS. 88% THR. BP- 172/80. EF 60%. Negative.    CARDIOVASCULAR STRESS TEST  02/21/2023    11 Min. 13.4 METS. 90% THR. 173/74. EF 57%. Negative    CONVERTED (HISTORICAL) DUPLEX SCANS  04/13/2015    Carotid US- Normal.      ECHO - CONVERTED  05/29/2007     EF 65%      ECHO - CONVERTED  05/18/2009    EF >60%, Mild MR.      ECHO - CONVERTED  07/12/2010    EF >60%, RVSP- 32.09mmHg.      ECHO - CONVERTED  09/08/2011     EF 65%.      ECHO - CONVERTED  08/13/2013    EF 65%. RVSp- 39 mmHg.      ECHO - CONVERTED  04/13/2015    EF 65%      ECHO - CONVERTED  02/18/2019    EF 60-65%. LA- 4.6 Cm. Mild MR. RVSP- 35 mmHg.    ECHO - CONVERTED  02/21/2023    EF 65%. LA- 4.0. Mild MR. RVSP- 28 mmHg    US CAROTID UNILATERAL  08/13/2020    No sig stenosis       Current Outpatient Medications   Medication Sig Dispense Refill    aspirin 81 MG EC tablet Take 1 tablet by mouth Daily. 90 tablet 3    atorvastatin (LIPITOR) 20 MG tablet Take 1 tablet by mouth Daily. 90 tablet 3    clopidogrel (PLAVIX) 75 MG tablet Take 1 tablet by mouth Daily. 90 tablet 3    labetalol (NORMODYNE) 200 MG tablet Take 1 tablet by mouth Every 12 (Twelve) Hours. 180 tablet 3    losartan-hydrochlorothiazide (HYZAAR) 100-25 MG per tablet Take 1 tablet by mouth Daily. 90 tablet 3    NIFEdipine XL (Procardia XL) 60 MG 24 hr tablet Take 1 tablet by mouth Daily. 90 tablet 3    pantoprazole (Protonix) 40 MG EC tablet Take 1 tablet by mouth Daily. 90 tablet 3    tadalafil (CIALIS) 5 MG tablet Take 1 tablet by mouth Daily. 90 tablet 3     No current facility-administered medications for this visit.       Allergies  :  Patient has no known allergies.       Past Medical History:   Diagnosis Date    H/O prostate biopsy     negative    Hypercholesterolemia     Hypertension     IHD (ischemic heart disease)     S/P stenting of  "LAD & diagonal    MR (mitral regurgitation)     mild    pulse oximetry 07/07/2010    Overnight oximetry- Abnormal, sleep study ordered, pt. wants to lose wt. first.       Social History     Socioeconomic History    Marital status:    Tobacco Use    Smoking status: Never    Smokeless tobacco: Never   Substance and Sexual Activity    Alcohol use: Yes     Comment: states he drinks beer or scotch every other day    Drug use: No       Family History   Problem Relation Age of Onset    Heart disease Mother     Heart disease Father        Review of Systems   Constitutional: Negative for decreased appetite and malaise/fatigue.   HENT:  Negative for congestion and sore throat.    Eyes:  Negative for blurred vision, double vision and visual disturbance.   Respiratory:  Negative for shortness of breath and snoring.    Endocrine: Negative for cold intolerance and heat intolerance.   Hematologic/Lymphatic: Negative for adenopathy. Does not bruise/bleed easily.   Skin:  Positive for rash. Negative for itching, nail changes and skin cancer.   Musculoskeletal:  Negative for arthritis and myalgias.   Gastrointestinal:  Negative for abdominal pain, dysphagia and heartburn.   Genitourinary:  Negative for bladder incontinence and frequency.   Neurological:  Negative for dizziness, seizures and vertigo.   Psychiatric/Behavioral:  Negative for altered mental status.    Allergic/Immunologic: Negative for environmental allergies and hives.     Diabetes- No  Thyroid- normal    Objective     /80   Pulse 60   Ht 180.3 cm (71\")   Wt 105 kg (232 lb)   BMI 32.36 kg/mý     Vitals and nursing note reviewed.   Constitutional:       Appearance: Healthy appearance. Not in distress.   Eyes:      Conjunctiva/sclera: Conjunctivae normal.      Pupils: Pupils are equal, round, and reactive to light.   HENT:      Head: Normocephalic.   Pulmonary:      Effort: Pulmonary effort is normal.      Breath sounds: Normal breath sounds. "   Cardiovascular:      PMI at left midclavicular line. Normal rate. Regular rhythm.   Abdominal:      General: Bowel sounds are normal.      Palpations: Abdomen is soft.   Musculoskeletal: Normal range of motion.      Cervical back: Normal range of motion and neck supple. Skin:     General: Skin is warm and dry.   Neurological:      Mental Status: Alert, oriented to person, place, and time and oriented to person, place and time.   Procedures            @ASSESSMENT/PLAN@        Diagnoses and all orders for this visit:    1. IHD (ischemic heart disease) (Primary)  -     clopidogrel (PLAVIX) 75 MG tablet; Take 1 tablet by mouth Daily.  Dispense: 90 tablet; Refill: 3  -     aspirin 81 MG EC tablet; Take 1 tablet by mouth Daily.  Dispense: 90 tablet; Refill: 3  -     CBC & Differential; Future    2. Essential hypertension  -     losartan-hydrochlorothiazide (HYZAAR) 100-25 MG per tablet; Take 1 tablet by mouth Daily.  Dispense: 90 tablet; Refill: 3  -     labetalol (NORMODYNE) 200 MG tablet; Take 1 tablet by mouth Every 12 (Twelve) Hours.  Dispense: 180 tablet; Refill: 3  -     NIFEdipine XL (Procardia XL) 60 MG 24 hr tablet; Take 1 tablet by mouth Daily.  Dispense: 90 tablet; Refill: 3  -     Comprehensive Metabolic Panel; Future    3. Hypercholesteremia  -     atorvastatin (LIPITOR) 20 MG tablet; Take 1 tablet by mouth Daily.  Dispense: 90 tablet; Refill: 3  -     Lipid Panel; Future    4. Metabolic syndrome    5. Sleep apnea in adult    6. Frequency of micturition   -     tadalafil (CIALIS) 5 MG tablet; Take 1 tablet by mouth Daily.  Dispense: 90 tablet; Refill: 3  -     PSA DIAGNOSTIC; Future    7. Heart burn  -     pantoprazole (Protonix) 40 MG EC tablet; Take 1 tablet by mouth Daily.  Dispense: 90 tablet; Refill: 3       At baseline his heart rate and blood pressure appears stable.  His initial blood pressure was mildly elevated but after checking it again it came down to normal.  His BMI is still around 32.  His  cardiovascular examination is otherwise unremarkable    Regarding his ischemic heart disease, he has undergone stenting in the past, no anginal symptoms noted.  His stress test was negative.  Continue Plavix and aspirin.  He need his blood count checked again    Regarding his hypertension, it is now well controlled with a combination of Hyzaar, Procardia XL and labetalol.  Continue the same.  Need to recheck his renal function and electrolytes    Regarding his hypercholesterolemia, he is on Lipitor at 20 mg.  Continue the same.  No myalgia noted.  Recheck the lipid panel.     Regarding the metabolic syndrome, had a long talk with him again about it.  I explained to him about intermittent fasting diet which he is going to try    Regarding his sleep apnea, he does use CPAP.  Continue to monitor    Regarding his frequent micturition, we will recheck his PSA level also.    Regarding his heartburn, his H. pylori antibody was negative.  Pantoprazole is helping him.  Continue the same.    Regarding advanced directive, he does have a living will and power of .  We discussed about it in detail.                        Electronically signed by Ovidio Warren MD August 16, 2023 13:58 EDT

## 2024-03-18 ENCOUNTER — OFFICE VISIT (OUTPATIENT)
Dept: CARDIOLOGY | Facility: CLINIC | Age: 74
End: 2024-03-18
Payer: MEDICARE

## 2024-03-18 VITALS
WEIGHT: 227.8 LBS | DIASTOLIC BLOOD PRESSURE: 80 MMHG | SYSTOLIC BLOOD PRESSURE: 134 MMHG | HEART RATE: 56 BPM | HEIGHT: 71 IN | BODY MASS INDEX: 31.89 KG/M2

## 2024-03-18 DIAGNOSIS — C43.9 MALIGNANT MELANOMA OF SKIN: ICD-10-CM

## 2024-03-18 DIAGNOSIS — N40.0 ENLARGED PROSTATE: ICD-10-CM

## 2024-03-18 DIAGNOSIS — R73.9 HYPERGLYCEMIA: ICD-10-CM

## 2024-03-18 DIAGNOSIS — R12 HEART BURN: ICD-10-CM

## 2024-03-18 DIAGNOSIS — E88.810 METABOLIC SYNDROME: ICD-10-CM

## 2024-03-18 DIAGNOSIS — G47.30 SLEEP APNEA IN ADULT: ICD-10-CM

## 2024-03-18 DIAGNOSIS — E78.00 HYPERCHOLESTEREMIA: ICD-10-CM

## 2024-03-18 DIAGNOSIS — I10 ESSENTIAL HYPERTENSION: ICD-10-CM

## 2024-03-18 DIAGNOSIS — R35.0 FREQUENCY OF MICTURITION: ICD-10-CM

## 2024-03-18 DIAGNOSIS — I25.9 IHD (ISCHEMIC HEART DISEASE): Primary | ICD-10-CM

## 2024-03-18 PROCEDURE — 3079F DIAST BP 80-89 MM HG: CPT | Performed by: INTERNAL MEDICINE

## 2024-03-18 PROCEDURE — 1159F MED LIST DOCD IN RCRD: CPT | Performed by: INTERNAL MEDICINE

## 2024-03-18 PROCEDURE — 1160F RVW MEDS BY RX/DR IN RCRD: CPT | Performed by: INTERNAL MEDICINE

## 2024-03-18 PROCEDURE — 99214 OFFICE O/P EST MOD 30 MIN: CPT | Performed by: INTERNAL MEDICINE

## 2024-03-18 PROCEDURE — 3075F SYST BP GE 130 - 139MM HG: CPT | Performed by: INTERNAL MEDICINE

## 2024-03-18 RX ORDER — LABETALOL 200 MG/1
200 TABLET, FILM COATED ORAL EVERY 12 HOURS SCHEDULED
Qty: 180 TABLET | Refills: 3 | Status: SHIPPED | OUTPATIENT
Start: 2024-03-18

## 2024-03-18 RX ORDER — CLOPIDOGREL BISULFATE 75 MG/1
75 TABLET ORAL DAILY
Qty: 90 TABLET | Refills: 3 | Status: SHIPPED | OUTPATIENT
Start: 2024-03-18

## 2024-03-18 RX ORDER — LOSARTAN POTASSIUM AND HYDROCHLOROTHIAZIDE 25; 100 MG/1; MG/1
1 TABLET ORAL DAILY
Qty: 90 TABLET | Refills: 3 | Status: SHIPPED | OUTPATIENT
Start: 2024-03-18

## 2024-03-18 RX ORDER — ATORVASTATIN CALCIUM 20 MG/1
20 TABLET, FILM COATED ORAL DAILY
Qty: 90 TABLET | Refills: 3 | Status: SHIPPED | OUTPATIENT
Start: 2024-03-18

## 2024-03-18 RX ORDER — PANTOPRAZOLE SODIUM 40 MG/1
40 TABLET, DELAYED RELEASE ORAL DAILY
Qty: 90 TABLET | Refills: 3 | Status: SHIPPED | OUTPATIENT
Start: 2024-03-18

## 2024-03-18 RX ORDER — NIFEDIPINE 60 MG/1
60 TABLET, EXTENDED RELEASE ORAL DAILY
Qty: 90 TABLET | Refills: 3 | Status: SHIPPED | OUTPATIENT
Start: 2024-03-18

## 2024-03-18 RX ORDER — ASPIRIN 81 MG/1
81 TABLET ORAL DAILY
Qty: 90 TABLET | Refills: 3 | Status: SHIPPED | OUTPATIENT
Start: 2024-03-18

## 2024-03-18 RX ORDER — TADALAFIL 5 MG/1
5 TABLET ORAL DAILY
Qty: 90 TABLET | Refills: 3 | Status: SHIPPED | OUTPATIENT
Start: 2024-03-18

## 2024-03-18 NOTE — LETTER
March 18, 2024       No Recipients    Patient: Edgardo Ortez   YOB: 1950   Date of Visit: 3/18/2024     Dear Edgardo Espinal MD:       Thank you for referring Edgardo Ortez to me for evaluation. Below are the relevant portions of my assessment and plan of care.    If you have questions, please do not hesitate to call me. I look forward to following Edgardo along with you.         Sincerely,        Ovidio Warren MD        CC:   No Recipients    Ovidio Warren MD  03/18/24 1401  Sign when Signing Visit  Chief Complaint   Patient presents with   • Follow-up     Pt is here for cardiac follow up.  Pt denies CP, SOB, dizziness or palpitations.  Pt does take a daily ASA.     • Med Refill     Pt request 90 day refills to be sent to [facility].  Medications were verified by the pt.     • Lab Work     Pt states their last labs were 8/14/23 with [facility].         CARDIAC COMPLAINTS  Cardiac management        Subjective  Edgardo Ortez is a 73 y.o. male came in today for his regular follow-up visit.  He has history of hypertension, hypercholesterolemia was diagnosed with ischemic heart disease in 2005 when he underwent stenting of the LAD and diagonal.  He did have restenosis and underwent stents again in the LAD and diagonal in 2006.  Since then he has done extremely well with medication.  His last stress test which was done about a year ago showed very good effort tolerance, normal LV function, mild hypertensive blood pressure response.  He has been managed medically.  He did undergo lab work in February of last year.  He did not undergo the labs in September.  His cholesterol is 160 with the LDL of 91.  He came today with no new symptoms.  He is able to do his day-to-day activities without any problem.              Cardiac History  Past Surgical History:   Procedure Laterality Date   • CARDIAC CATHETERIZATION  2005    PCI of LAD & D1     • CARDIAC CATHETERIZATION  11/02/2006     75% LAD- 3.0 x 33 mm Cypher. 70% D1 2.5 x 18 mm Cypher Stent.     • CARDIOVASCULAR STRESS TEST  05/29/2007    10 Min 75% THR.Negative     • CARDIOVASCULAR STRESS TEST  05/18/2009    10 min, 98% tHR. Negative     • CARDIOVASCULAR STRESS TEST  07/12/2010    10 min. 83% THR. BP- 160/70. Anterior Ischemia.     • CARDIOVASCULAR STRESS TEST  09/08/2011    10 Min.85%THR. Negative     • CARDIOVASCULAR STRESS TEST  08/13/2013    10 Min,15 Secs. 89% THR. BP- 172/90. Negative.     • CARDIOVASCULAR STRESS TEST  04/13/2015    10 Min, 84% THR. BP- 184/78. Negative     • CARDIOVASCULAR STRESS TEST  02/18/2019    10 Min. 12.8 METS. 88% THR. BP- 172/80. EF 60%. Negative.   • CARDIOVASCULAR STRESS TEST  02/21/2023    11 Min. 13.4 METS. 90% THR. 173/74. EF 57%. Negative   • CATARACT EXTRACTION, BILATERAL  10/2023   • CONVERTED (HISTORICAL) DUPLEX SCANS  04/13/2015    Carotid US- Normal.     • CORONARY ANGIOPLASTY     • CORONARY STENT PLACEMENT  2006   • ECHO - CONVERTED  05/29/2007     EF 65%     • ECHO - CONVERTED  05/18/2009    EF >60%, Mild MR.     • ECHO - CONVERTED  07/12/2010    EF >60%, RVSP- 32.09mmHg.     • ECHO - CONVERTED  09/08/2011     EF 65%.     • ECHO - CONVERTED  08/13/2013    EF 65%. RVSp- 39 mmHg.     • ECHO - CONVERTED  04/13/2015    EF 65%     • ECHO - CONVERTED  02/18/2019    EF 60-65%. LA- 4.6 Cm. Mild MR. RVSP- 35 mmHg.   • ECHO - CONVERTED  02/21/2023    EF 65%. LA- 4.0. Mild MR. RVSP- 28 mmHg   • US CAROTID UNILATERAL  08/13/2020    No sig stenosis       Current Outpatient Medications   Medication Sig Dispense Refill   • aspirin 81 MG EC tablet Take 1 tablet by mouth Daily. 90 tablet 3   • atorvastatin (LIPITOR) 20 MG tablet Take 1 tablet by mouth Daily. 90 tablet 3   • clopidogrel (PLAVIX) 75 MG tablet Take 1 tablet by mouth Daily. 90 tablet 3   • labetalol (NORMODYNE) 200 MG tablet Take 1 tablet by mouth Every 12 (Twelve) Hours. 180 tablet 3   • losartan-hydrochlorothiazide (HYZAAR) 100-25 MG per tablet  Take 1 tablet by mouth Daily. 90 tablet 3   • NIFEdipine XL (Procardia XL) 60 MG 24 hr tablet Take 1 tablet by mouth Daily. 90 tablet 3   • pantoprazole (Protonix) 40 MG EC tablet Take 1 tablet by mouth Daily. 90 tablet 3   • tadalafil (CIALIS) 5 MG tablet Take 1 tablet by mouth Daily. 90 tablet 3     No current facility-administered medications for this visit.       Allergies  :  Patient has no known allergies.       Past Medical History:   Diagnosis Date   • Cancer August 2019    Melanoma   • Coronary artery disease 2003?    Not sure if date or year   • H/O prostate biopsy     negative   • Hypercholesterolemia    • Hypertension    • IHD (ischemic heart disease)     S/P stenting of LAD & diagonal   • MR (mitral regurgitation)     mild   • pulse oximetry 07/07/2010    Overnight oximetry- Abnormal, sleep study ordered, pt. wants to lose wt. first.   • Sleep apnea 2006    Not sure of extact year       Social History     Socioeconomic History   • Marital status:    Tobacco Use   • Smoking status: Never   • Smokeless tobacco: Never   Vaping Use   • Vaping status: Never Used   Substance and Sexual Activity   • Alcohol use: Yes     Alcohol/week: 8.0 standard drinks of alcohol     Types: 4 Cans of beer, 4 Shots of liquor per week     Comment: states he drinks beer or scotch every other day   • Drug use: No   • Sexual activity: Yes     Partners: Female     Birth control/protection: None, Vasectomy       Family History   Problem Relation Age of Onset   • Heart disease Mother    • Heart attack Mother    • Hypertension Mother    • Heart disease Father    • Heart attack Father    • Hypertension Father        Review of Systems   Constitutional: Negative for decreased appetite and malaise/fatigue.   HENT:  Negative for congestion and sore throat.    Eyes:  Negative for blurred vision, double vision and visual disturbance.   Cardiovascular:  Negative for chest pain and paroxysmal nocturnal dyspnea.   Respiratory:  Negative  "for shortness of breath and snoring.    Endocrine: Negative for cold intolerance and heat intolerance.   Hematologic/Lymphatic: Negative for adenopathy. Does not bruise/bleed easily.   Skin:  Negative for itching, nail changes and skin cancer.   Musculoskeletal:  Negative for arthritis and myalgias.   Gastrointestinal:  Negative for abdominal pain, dysphagia and heartburn.   Genitourinary:  Positive for frequency. Negative for bladder incontinence.   Neurological:  Negative for dizziness, seizures and vertigo.   Psychiatric/Behavioral:  Negative for altered mental status.    Allergic/Immunologic: Negative for environmental allergies and hives.     Diabetes- No  Thyroid- normal    Objective    /80   Pulse 56   Ht 180.3 cm (71\")   Wt 103 kg (227 lb 12.8 oz)   BMI 31.77 kg/m²     Vitals and nursing note reviewed.   Constitutional:       Appearance: Healthy appearance. Not in distress.   Eyes:      Conjunctiva/sclera: Conjunctivae normal.      Pupils: Pupils are equal, round, and reactive to light.   HENT:      Head: Normocephalic.   Pulmonary:      Effort: Pulmonary effort is normal.      Breath sounds: Normal breath sounds.   Cardiovascular:      PMI at left midclavicular line. Normal rate. Regular rhythm.   Abdominal:      General: Bowel sounds are normal.      Palpations: Abdomen is soft.   Musculoskeletal: Normal range of motion.      Cervical back: Normal range of motion and neck supple. Skin:     General: Skin is warm and dry.   Neurological:      Mental Status: Alert, oriented to person, place, and time and oriented to person, place and time.   Procedures            @ASSESSMENT/PLAN@  BMI is >= 30 and <35. (Class 1 Obesity). The following options were offered after discussion;: nutrition counseling/recommendations     Diagnoses and all orders for this visit:    1. IHD (ischemic heart disease) (Primary)  -     aspirin 81 MG EC tablet; Take 1 tablet by mouth Daily.  Dispense: 90 tablet; Refill: 3  -     " clopidogrel (PLAVIX) 75 MG tablet; Take 1 tablet by mouth Daily.  Dispense: 90 tablet; Refill: 3  -     CBC & Differential; Future    2. Essential hypertension  -     labetalol (NORMODYNE) 200 MG tablet; Take 1 tablet by mouth Every 12 (Twelve) Hours.  Dispense: 180 tablet; Refill: 3  -     losartan-hydrochlorothiazide (HYZAAR) 100-25 MG per tablet; Take 1 tablet by mouth Daily.  Dispense: 90 tablet; Refill: 3  -     NIFEdipine XL (Procardia XL) 60 MG 24 hr tablet; Take 1 tablet by mouth Daily.  Dispense: 90 tablet; Refill: 3  -     Comprehensive Metabolic Panel; Future    3. Hypercholesteremia  -     atorvastatin (LIPITOR) 20 MG tablet; Take 1 tablet by mouth Daily.  Dispense: 90 tablet; Refill: 3  -     Lipid Panel; Future    4. Metabolic syndrome    5. Malignant melanoma of skin    6. Sleep apnea in adult  -     CBC & Differential; Future    7. Frequency of micturition   -     tadalafil (CIALIS) 5 MG tablet; Take 1 tablet by mouth Daily.  Dispense: 90 tablet; Refill: 3    8. Heart burn  -     pantoprazole (Protonix) 40 MG EC tablet; Take 1 tablet by mouth Daily.  Dispense: 90 tablet; Refill: 3    9. Hyperglycemia  -     Hemoglobin A1c; Future    10. Enlarged prostate  -     PSA DIAGNOSTIC; Future       At baseline he is mildly bradycardic.  His blood pressure is normal.  His BMI is around 32.  His cardiovascular examination is otherwise unremarkable.    Regarding his ischemic heart disease, he has undergone PCI of the LAD and diagonal.  His last stress test was negative for any ischemia.  At this time continue aspirin and Plavix.  He need his blood count checked again    Regarding his hypertension, he is on Hyzaar, labetalol and Procardia XL.  Continue the same and check the electrolytes and renal function    Regarding his hypercholesterolemia, he is on Lipitor at 20 mg.  I like to check his lipid profile along with LFT    Regarding his metabolic syndrome, talked with him again about diet and weight  reduction.    Regarding his history of malignant melanoma, he has been followed by the dermatologist.    Regarding sleep apnea, he does wear the CPAP.  I like to check his CBC level    Regarding his history of frequent micturition continue Cialis regarding his heartburn, continue Protonix    Regarding his history of hyperglycemia we will check the A1c level again    Regarding his history of enlarged prostate, will check the PSA level    Regarding advanced directive, he does have a living will and power of .    Overall cardiac status appears stable.  I will see him back in 6 months or sooner if needed                    Electronically signed by Ovidio Warren MD March 18, 2024 13:56 EDT

## 2024-03-18 NOTE — PROGRESS NOTES
Chief Complaint   Patient presents with   • Follow-up     Pt is here for cardiac follow up.  Pt denies CP, SOB, dizziness or palpitations.  Pt does take a daily ASA.     • Med Refill     Pt request 90 day refills to be sent to [facility].  Medications were verified by the pt.     • Lab Work     Pt states their last labs were 8/14/23 with Mary.         CARDIAC COMPLAINTS  Cardiac management        Subjective   Edgardo Ortez is a 73 y.o. male came in today for his regular follow-up visit.  He has history of hypertension, hypercholesterolemia was diagnosed with ischemic heart disease in 2005 when he underwent stenting of the LAD and diagonal.  He did have restenosis and underwent stents again in the LAD and diagonal in 2006.  Since then he has done extremely well with medication.  His last stress test which was done about a year ago showed very good effort tolerance, normal LV function, mild hypertensive blood pressure response.  He has been managed medically.  He did undergo lab work in February of last year.  He did not undergo the labs in September.  His cholesterol is 160 with the LDL of 91.  He came today with no new symptoms.  He is able to do his day-to-day activities without any problem.              Cardiac History  Past Surgical History:   Procedure Laterality Date   • CARDIAC CATHETERIZATION  2005    PCI of LAD & D1     • CARDIAC CATHETERIZATION  11/02/2006    75% LAD- 3.0 x 33 mm Cypher. 70% D1 2.5 x 18 mm Cypher Stent.     • CARDIOVASCULAR STRESS TEST  05/29/2007    10 Min 75% THR.Negative     • CARDIOVASCULAR STRESS TEST  05/18/2009    10 min, 98% tHR. Negative     • CARDIOVASCULAR STRESS TEST  07/12/2010    10 min. 83% THR. BP- 160/70. Anterior Ischemia.     • CARDIOVASCULAR STRESS TEST  09/08/2011    10 Min.85%THR. Negative     • CARDIOVASCULAR STRESS TEST  08/13/2013    10 Min,15 Secs. 89% THR. BP- 172/90. Negative.     • CARDIOVASCULAR STRESS TEST  04/13/2015    10 Min, 84% THR. BP-  184/78. Negative     • CARDIOVASCULAR STRESS TEST  02/18/2019    10 Min. 12.8 METS. 88% THR. BP- 172/80. EF 60%. Negative.   • CARDIOVASCULAR STRESS TEST  02/21/2023    11 Min. 13.4 METS. 90% THR. 173/74. EF 57%. Negative   • CATARACT EXTRACTION, BILATERAL  10/2023   • CONVERTED (HISTORICAL) DUPLEX SCANS  04/13/2015    Carotid US- Normal.     • CORONARY ANGIOPLASTY     • CORONARY STENT PLACEMENT  2006   • ECHO - CONVERTED  05/29/2007     EF 65%     • ECHO - CONVERTED  05/18/2009    EF >60%, Mild MR.     • ECHO - CONVERTED  07/12/2010    EF >60%, RVSP- 32.09mmHg.     • ECHO - CONVERTED  09/08/2011     EF 65%.     • ECHO - CONVERTED  08/13/2013    EF 65%. RVSp- 39 mmHg.     • ECHO - CONVERTED  04/13/2015    EF 65%     • ECHO - CONVERTED  02/18/2019    EF 60-65%. LA- 4.6 Cm. Mild MR. RVSP- 35 mmHg.   • ECHO - CONVERTED  02/21/2023    EF 65%. LA- 4.0. Mild MR. RVSP- 28 mmHg   • US CAROTID UNILATERAL  08/13/2020    No sig stenosis       Current Outpatient Medications   Medication Sig Dispense Refill   • aspirin 81 MG EC tablet Take 1 tablet by mouth Daily. 90 tablet 3   • atorvastatin (LIPITOR) 20 MG tablet Take 1 tablet by mouth Daily. 90 tablet 3   • clopidogrel (PLAVIX) 75 MG tablet Take 1 tablet by mouth Daily. 90 tablet 3   • labetalol (NORMODYNE) 200 MG tablet Take 1 tablet by mouth Every 12 (Twelve) Hours. 180 tablet 3   • losartan-hydrochlorothiazide (HYZAAR) 100-25 MG per tablet Take 1 tablet by mouth Daily. 90 tablet 3   • NIFEdipine XL (Procardia XL) 60 MG 24 hr tablet Take 1 tablet by mouth Daily. 90 tablet 3   • pantoprazole (Protonix) 40 MG EC tablet Take 1 tablet by mouth Daily. 90 tablet 3   • tadalafil (CIALIS) 5 MG tablet Take 1 tablet by mouth Daily. 90 tablet 3     No current facility-administered medications for this visit.       Allergies  :  Patient has no known allergies.       Past Medical History:   Diagnosis Date   • Cancer August 2019    Melanoma   • Coronary artery disease 2003?    Not sure  if date or year   • H/O prostate biopsy     negative   • Hypercholesterolemia    • Hypertension    • IHD (ischemic heart disease)     S/P stenting of LAD & diagonal   • MR (mitral regurgitation)     mild   • pulse oximetry 07/07/2010    Overnight oximetry- Abnormal, sleep study ordered, pt. wants to lose wt. first.   • Sleep apnea 2006    Not sure of extact year       Social History     Socioeconomic History   • Marital status:    Tobacco Use   • Smoking status: Never   • Smokeless tobacco: Never   Vaping Use   • Vaping status: Never Used   Substance and Sexual Activity   • Alcohol use: Yes     Alcohol/week: 8.0 standard drinks of alcohol     Types: 4 Cans of beer, 4 Shots of liquor per week     Comment: states he drinks beer or scotch every other day   • Drug use: No   • Sexual activity: Yes     Partners: Female     Birth control/protection: None, Vasectomy       Family History   Problem Relation Age of Onset   • Heart disease Mother    • Heart attack Mother    • Hypertension Mother    • Heart disease Father    • Heart attack Father    • Hypertension Father        Review of Systems   Constitutional: Negative for decreased appetite and malaise/fatigue.   HENT:  Negative for congestion and sore throat.    Eyes:  Negative for blurred vision, double vision and visual disturbance.   Cardiovascular:  Negative for chest pain and paroxysmal nocturnal dyspnea.   Respiratory:  Negative for shortness of breath and snoring.    Endocrine: Negative for cold intolerance and heat intolerance.   Hematologic/Lymphatic: Negative for adenopathy. Does not bruise/bleed easily.   Skin:  Negative for itching, nail changes and skin cancer.   Musculoskeletal:  Negative for arthritis and myalgias.   Gastrointestinal:  Negative for abdominal pain, dysphagia and heartburn.   Genitourinary:  Positive for frequency. Negative for bladder incontinence.   Neurological:  Negative for dizziness, seizures and vertigo.   Psychiatric/Behavioral:   "Negative for altered mental status.    Allergic/Immunologic: Negative for environmental allergies and hives.     Diabetes- No  Thyroid- normal    Objective     /80   Pulse 56   Ht 180.3 cm (71\")   Wt 103 kg (227 lb 12.8 oz)   BMI 31.77 kg/m²     Vitals and nursing note reviewed.   Constitutional:       Appearance: Healthy appearance. Not in distress.   Eyes:      Conjunctiva/sclera: Conjunctivae normal.      Pupils: Pupils are equal, round, and reactive to light.   HENT:      Head: Normocephalic.   Pulmonary:      Effort: Pulmonary effort is normal.      Breath sounds: Normal breath sounds.   Cardiovascular:      PMI at left midclavicular line. Normal rate. Regular rhythm.   Abdominal:      General: Bowel sounds are normal.      Palpations: Abdomen is soft.   Musculoskeletal: Normal range of motion.      Cervical back: Normal range of motion and neck supple. Skin:     General: Skin is warm and dry.   Neurological:      Mental Status: Alert, oriented to person, place, and time and oriented to person, place and time.   Procedures            @ASSESSMENT/PLAN@  BMI is >= 30 and <35. (Class 1 Obesity). The following options were offered after discussion;: nutrition counseling/recommendations     Diagnoses and all orders for this visit:    1. IHD (ischemic heart disease) (Primary)  -     aspirin 81 MG EC tablet; Take 1 tablet by mouth Daily.  Dispense: 90 tablet; Refill: 3  -     clopidogrel (PLAVIX) 75 MG tablet; Take 1 tablet by mouth Daily.  Dispense: 90 tablet; Refill: 3  -     CBC & Differential; Future    2. Essential hypertension  -     labetalol (NORMODYNE) 200 MG tablet; Take 1 tablet by mouth Every 12 (Twelve) Hours.  Dispense: 180 tablet; Refill: 3  -     losartan-hydrochlorothiazide (HYZAAR) 100-25 MG per tablet; Take 1 tablet by mouth Daily.  Dispense: 90 tablet; Refill: 3  -     NIFEdipine XL (Procardia XL) 60 MG 24 hr tablet; Take 1 tablet by mouth Daily.  Dispense: 90 tablet; Refill: 3  -     " Comprehensive Metabolic Panel; Future    3. Hypercholesteremia  -     atorvastatin (LIPITOR) 20 MG tablet; Take 1 tablet by mouth Daily.  Dispense: 90 tablet; Refill: 3  -     Lipid Panel; Future    4. Metabolic syndrome    5. Malignant melanoma of skin    6. Sleep apnea in adult  -     CBC & Differential; Future    7. Frequency of micturition   -     tadalafil (CIALIS) 5 MG tablet; Take 1 tablet by mouth Daily.  Dispense: 90 tablet; Refill: 3    8. Heart burn  -     pantoprazole (Protonix) 40 MG EC tablet; Take 1 tablet by mouth Daily.  Dispense: 90 tablet; Refill: 3    9. Hyperglycemia  -     Hemoglobin A1c; Future    10. Enlarged prostate  -     PSA DIAGNOSTIC; Future       At baseline he is mildly bradycardic.  His blood pressure is normal.  His BMI is around 32.  His cardiovascular examination is otherwise unremarkable.    Regarding his ischemic heart disease, he has undergone PCI of the LAD and diagonal.  His last stress test was negative for any ischemia.  At this time continue aspirin and Plavix.  He need his blood count checked again    Regarding his hypertension, he is on Hyzaar, labetalol and Procardia XL.  Continue the same and check the electrolytes and renal function    Regarding his hypercholesterolemia, he is on Lipitor at 20 mg.  I like to check his lipid profile along with LFT    Regarding his metabolic syndrome, talked with him again about diet and weight reduction.    Regarding his history of malignant melanoma, he has been followed by the dermatologist.    Regarding sleep apnea, he does wear the CPAP.  I like to check his CBC level    Regarding his history of frequent micturition continue Cialis regarding his heartburn, continue Protonix    Regarding his history of hyperglycemia we will check the A1c level again    Regarding his history of enlarged prostate, will check the PSA level    Regarding advanced directive, he does have a living will and power of .    Overall cardiac status  appears stable.  I will see him back in 6 months or sooner if needed                    Electronically signed by Ovidio Warren MD March 18, 2024 13:56 EDT

## 2024-03-19 ENCOUNTER — LAB (OUTPATIENT)
Dept: LAB | Facility: HOSPITAL | Age: 74
End: 2024-03-19
Payer: MEDICARE

## 2024-03-19 DIAGNOSIS — I10 ESSENTIAL HYPERTENSION: ICD-10-CM

## 2024-03-19 DIAGNOSIS — G47.30 SLEEP APNEA IN ADULT: ICD-10-CM

## 2024-03-19 DIAGNOSIS — R73.9 HYPERGLYCEMIA: ICD-10-CM

## 2024-03-19 DIAGNOSIS — N40.0 ENLARGED PROSTATE: ICD-10-CM

## 2024-03-19 DIAGNOSIS — I25.9 IHD (ISCHEMIC HEART DISEASE): ICD-10-CM

## 2024-03-19 DIAGNOSIS — E78.00 HYPERCHOLESTEREMIA: ICD-10-CM

## 2024-03-19 LAB — HBA1C MFR BLD: 5.4 % (ref 4.8–5.6)

## 2024-03-19 PROCEDURE — 84153 ASSAY OF PSA TOTAL: CPT | Performed by: INTERNAL MEDICINE

## 2024-03-19 PROCEDURE — 85025 COMPLETE CBC W/AUTO DIFF WBC: CPT | Performed by: INTERNAL MEDICINE

## 2024-03-19 PROCEDURE — 80061 LIPID PANEL: CPT | Performed by: INTERNAL MEDICINE

## 2024-03-19 PROCEDURE — 83036 HEMOGLOBIN GLYCOSYLATED A1C: CPT

## 2024-03-19 PROCEDURE — 80053 COMPREHEN METABOLIC PANEL: CPT | Performed by: INTERNAL MEDICINE

## 2024-10-14 ENCOUNTER — TELEPHONE (OUTPATIENT)
Dept: CARDIOLOGY | Facility: CLINIC | Age: 74
End: 2024-10-14
Payer: MEDICARE

## 2024-10-14 NOTE — TELEPHONE ENCOUNTER
Caller: Edgardo Ortez    Relationship to patient: Self    Best call back number: [phone]    Chief complaint: NEEDS TO RESCHEDULE HIS APPT ON 10.23.24    Type of visit: 6 MONTH FU    Requested date: CAN NOT DO 18TH  OR ALL NEXT WEEK OR 30TH. FIRST 3 WEEKS OF NOVEMBER ARE GOOD.     If rescheduling, when is the original appointment: 10.23.24

## 2024-10-15 NOTE — TELEPHONE ENCOUNTER
Susan had sent me a message on this patient so I had called him and rescheduled his appointment for 11/12/2024 @ 2:15 with Dr. Warren.

## 2024-11-12 ENCOUNTER — OFFICE VISIT (OUTPATIENT)
Dept: CARDIOLOGY | Facility: CLINIC | Age: 74
End: 2024-11-12
Payer: MEDICARE

## 2024-11-12 VITALS
DIASTOLIC BLOOD PRESSURE: 80 MMHG | WEIGHT: 238.6 LBS | SYSTOLIC BLOOD PRESSURE: 136 MMHG | HEART RATE: 56 BPM | HEIGHT: 71 IN | BODY MASS INDEX: 33.4 KG/M2

## 2024-11-12 DIAGNOSIS — G47.30 SLEEP APNEA IN ADULT: ICD-10-CM

## 2024-11-12 DIAGNOSIS — R12 HEART BURN: ICD-10-CM

## 2024-11-12 DIAGNOSIS — E78.00 HYPERCHOLESTEREMIA: ICD-10-CM

## 2024-11-12 DIAGNOSIS — I10 ESSENTIAL HYPERTENSION: ICD-10-CM

## 2024-11-12 DIAGNOSIS — R35.0 FREQUENCY OF MICTURITION: ICD-10-CM

## 2024-11-12 DIAGNOSIS — I25.9 IHD (ISCHEMIC HEART DISEASE): Primary | ICD-10-CM

## 2024-11-12 DIAGNOSIS — N40.0 ENLARGED PROSTATE: ICD-10-CM

## 2024-11-12 PROCEDURE — 99214 OFFICE O/P EST MOD 30 MIN: CPT | Performed by: INTERNAL MEDICINE

## 2024-11-12 PROCEDURE — 3079F DIAST BP 80-89 MM HG: CPT | Performed by: INTERNAL MEDICINE

## 2024-11-12 PROCEDURE — 1160F RVW MEDS BY RX/DR IN RCRD: CPT | Performed by: INTERNAL MEDICINE

## 2024-11-12 PROCEDURE — 1159F MED LIST DOCD IN RCRD: CPT | Performed by: INTERNAL MEDICINE

## 2024-11-12 PROCEDURE — 3075F SYST BP GE 130 - 139MM HG: CPT | Performed by: INTERNAL MEDICINE

## 2024-11-12 RX ORDER — ASPIRIN 81 MG/1
81 TABLET ORAL DAILY
Qty: 90 TABLET | Refills: 3 | Status: SHIPPED | OUTPATIENT
Start: 2024-11-12

## 2024-11-12 RX ORDER — ATORVASTATIN CALCIUM 20 MG/1
20 TABLET, FILM COATED ORAL DAILY
Qty: 90 TABLET | Refills: 3 | Status: SHIPPED | OUTPATIENT
Start: 2024-11-12

## 2024-11-12 RX ORDER — CLOPIDOGREL BISULFATE 75 MG/1
75 TABLET ORAL DAILY
Qty: 90 TABLET | Refills: 3 | Status: SHIPPED | OUTPATIENT
Start: 2024-11-12

## 2024-11-12 RX ORDER — PANTOPRAZOLE SODIUM 40 MG/1
40 TABLET, DELAYED RELEASE ORAL DAILY
Qty: 90 TABLET | Refills: 3 | Status: SHIPPED | OUTPATIENT
Start: 2024-11-12

## 2024-11-12 RX ORDER — NIFEDIPINE 60 MG/1
60 TABLET, EXTENDED RELEASE ORAL DAILY
Qty: 90 TABLET | Refills: 3 | Status: SHIPPED | OUTPATIENT
Start: 2024-11-12

## 2024-11-12 RX ORDER — LOSARTAN POTASSIUM AND HYDROCHLOROTHIAZIDE 25; 100 MG/1; MG/1
1 TABLET ORAL DAILY
Qty: 90 TABLET | Refills: 3 | Status: SHIPPED | OUTPATIENT
Start: 2024-11-12

## 2024-11-12 RX ORDER — TADALAFIL 5 MG/1
5 TABLET ORAL DAILY
Qty: 90 TABLET | Refills: 3 | Status: SHIPPED | OUTPATIENT
Start: 2024-11-12

## 2024-11-12 RX ORDER — LABETALOL 200 MG/1
200 TABLET, FILM COATED ORAL EVERY 12 HOURS SCHEDULED
Qty: 180 TABLET | Refills: 3 | Status: SHIPPED | OUTPATIENT
Start: 2024-11-12

## 2024-11-12 NOTE — PROGRESS NOTES
Chief Complaint   Patient presents with   • Follow-up     Cardiac management   • Lab     Last labs in chart.   • Med Refill     Needs refills on cardiac medications-90 day       CARDIAC COMPLAINTS  Cardiac management        Subjective   Edgardo Ortez is a 74 y.o. male came in today for his regular follow-up visit.  He has history of hypertension, hypercholesterolemia who also has ischemic heart disease.  It was first diagnosed in 2005 when he presented with chest pain and underwent stenting of the LAD and diagonal.  He had restenosis and had repeat stenting of both in 2006.  Since then he has done extremely well.  He denies having any chest pain or shortness of breath.  His last cardiac workup which was done last year showed excellent effort tolerance, normal LV function, borderline hypertensive response and no evidence of ischemia.  He came today with no new symptoms.  He is still able to do most of his activities.  He unfortunately is not able to lose the weight.  His last lab work was in March and his lipids was very well-controlled.  His A1c was normal his renal and liver function was normal.              Cardiac History  Past Surgical History:   Procedure Laterality Date   • CARDIAC CATHETERIZATION  2005    PCI of LAD & D1     • CARDIAC CATHETERIZATION  11/02/2006    75% LAD- 3.0 x 33 mm Cypher. 70% D1 2.5 x 18 mm Cypher Stent.     • CARDIOVASCULAR STRESS TEST  05/29/2007    10 Min 75% THR.Negative     • CARDIOVASCULAR STRESS TEST  05/18/2009    10 min, 98% tHR. Negative     • CARDIOVASCULAR STRESS TEST  07/12/2010    10 min. 83% THR. BP- 160/70. Anterior Ischemia.     • CARDIOVASCULAR STRESS TEST  09/08/2011    10 Min.85%THR. Negative     • CARDIOVASCULAR STRESS TEST  08/13/2013    10 Min,15 Secs. 89% THR. BP- 172/90. Negative.     • CARDIOVASCULAR STRESS TEST  04/13/2015    10 Min, 84% THR. BP- 184/78. Negative     • CARDIOVASCULAR STRESS TEST  02/18/2019    10 Min. 12.8 METS. 88% THR. BP- 172/80. EF 60%.  Negative.   • CARDIOVASCULAR STRESS TEST  02/21/2023    11 Min. 13.4 METS. 90% THR. 173/74. EF 57%. Negative   • CATARACT EXTRACTION, BILATERAL  10/2023   • CONVERTED (HISTORICAL) DUPLEX SCANS  04/13/2015    Carotid US- Normal.     • CORONARY ANGIOPLASTY     • CORONARY STENT PLACEMENT  2006   • ECHO - CONVERTED  05/29/2007     EF 65%     • ECHO - CONVERTED  05/18/2009    EF >60%, Mild MR.     • ECHO - CONVERTED  07/12/2010    EF >60%, RVSP- 32.09mmHg.     • ECHO - CONVERTED  09/08/2011     EF 65%.     • ECHO - CONVERTED  08/13/2013    EF 65%. RVSp- 39 mmHg.     • ECHO - CONVERTED  04/13/2015    EF 65%     • ECHO - CONVERTED  02/18/2019    EF 60-65%. LA- 4.6 Cm. Mild MR. RVSP- 35 mmHg.   • ECHO - CONVERTED  02/21/2023    EF 65%. LA- 4.0. Mild MR. RVSP- 28 mmHg   • US CAROTID UNILATERAL  08/13/2020    No sig stenosis       Current Outpatient Medications   Medication Sig Dispense Refill   • aspirin 81 MG EC tablet Take 1 tablet by mouth Daily. 90 tablet 3   • atorvastatin (LIPITOR) 20 MG tablet Take 1 tablet by mouth Daily. 90 tablet 3   • clopidogrel (PLAVIX) 75 MG tablet Take 1 tablet by mouth Daily. 90 tablet 3   • labetalol (NORMODYNE) 200 MG tablet Take 1 tablet by mouth Every 12 (Twelve) Hours. 180 tablet 3   • losartan-hydrochlorothiazide (HYZAAR) 100-25 MG per tablet Take 1 tablet by mouth Daily. 90 tablet 3   • NIFEdipine XL (Procardia XL) 60 MG 24 hr tablet Take 1 tablet by mouth Daily. 90 tablet 3   • pantoprazole (Protonix) 40 MG EC tablet Take 1 tablet by mouth Daily. 90 tablet 3   • tadalafil (CIALIS) 5 MG tablet Take 1 tablet by mouth Daily. 90 tablet 3     No current facility-administered medications for this visit.       Allergies  :  Patient has no known allergies.       Past Medical History:   Diagnosis Date   • Cancer August 2019    Melanoma   • Coronary artery disease 2003?    Not sure if date or year   • H/O prostate biopsy     negative   • Hypercholesterolemia    • Hypertension    • IHD (ischemic  heart disease)     S/P stenting of LAD & diagonal   • MR (mitral regurgitation)     mild   • pulse oximetry 07/07/2010    Overnight oximetry- Abnormal, sleep study ordered, pt. wants to lose wt. first.   • Sleep apnea 2006    Not sure of extact year       Social History     Socioeconomic History   • Marital status:    Tobacco Use   • Smoking status: Never     Passive exposure: Past   • Smokeless tobacco: Never   Vaping Use   • Vaping status: Never Used   Substance and Sexual Activity   • Alcohol use: Not Currently     Comment: states he drinks beer or scotch maybe 2-3 times a week.   • Drug use: No   • Sexual activity: Yes     Partners: Female     Birth control/protection: None, Vasectomy       Family History   Problem Relation Age of Onset   • Heart disease Mother    • Heart attack Mother    • Hypertension Mother    • Heart disease Father    • Heart attack Father    • Hypertension Father        Review of Systems   Constitutional: Negative for decreased appetite and malaise/fatigue.   HENT:  Negative for congestion and sore throat.    Eyes:  Negative for blurred vision, double vision and visual disturbance.   Cardiovascular:  Negative for chest pain and palpitations.   Respiratory:  Negative for shortness of breath and snoring.    Endocrine: Negative for cold intolerance and heat intolerance.   Hematologic/Lymphatic: Negative for adenopathy. Does not bruise/bleed easily.   Skin:  Negative for itching, nail changes and skin cancer.   Musculoskeletal:  Negative for arthritis and myalgias.   Gastrointestinal:  Negative for abdominal pain, dysphagia and heartburn.   Genitourinary:  Negative for bladder incontinence and frequency.   Neurological:  Negative for dizziness, seizures and vertigo.   Psychiatric/Behavioral:  Negative for altered mental status.    Allergic/Immunologic: Negative for environmental allergies and hives.     Diabetes- No  Thyroid- normal    Objective     /80 (BP Location: Right arm,  "Patient Position: Sitting)   Pulse 56   Ht 180.3 cm (70.98\")   Wt 108 kg (238 lb 9.6 oz)   BMI 33.29 kg/m²     Vitals and nursing note reviewed.   Constitutional:       Appearance: Healthy appearance. Not in distress.   Eyes:      Conjunctiva/sclera: Conjunctivae normal.      Pupils: Pupils are equal, round, and reactive to light.   HENT:      Head: Normocephalic.   Pulmonary:      Effort: Pulmonary effort is normal.      Breath sounds: Normal breath sounds.   Cardiovascular:      PMI at left midclavicular line. Normal rate. Regular rhythm.   Abdominal:      General: Bowel sounds are normal.      Palpations: Abdomen is soft.   Musculoskeletal: Normal range of motion.      Cervical back: Normal range of motion and neck supple. Skin:     General: Skin is warm and dry.   Neurological:      Mental Status: Alert, oriented to person, place, and time and oriented to person, place and time.   Procedures            @ASSESSMENT/PLAN@        Diagnoses and all orders for this visit:    1. IHD (ischemic heart disease) (Primary)  -     aspirin 81 MG EC tablet; Take 1 tablet by mouth Daily.  Dispense: 90 tablet; Refill: 3  -     clopidogrel (PLAVIX) 75 MG tablet; Take 1 tablet by mouth Daily.  Dispense: 90 tablet; Refill: 3  -     CBC & Differential; Future    2. Hypercholesteremia  -     atorvastatin (LIPITOR) 20 MG tablet; Take 1 tablet by mouth Daily.  Dispense: 90 tablet; Refill: 3  -     Lipid Panel; Future    3. Essential hypertension  -     labetalol (NORMODYNE) 200 MG tablet; Take 1 tablet by mouth Every 12 (Twelve) Hours.  Dispense: 180 tablet; Refill: 3  -     losartan-hydrochlorothiazide (HYZAAR) 100-25 MG per tablet; Take 1 tablet by mouth Daily.  Dispense: 90 tablet; Refill: 3  -     NIFEdipine XL (Procardia XL) 60 MG 24 hr tablet; Take 1 tablet by mouth Daily.  Dispense: 90 tablet; Refill: 3  -     Comprehensive Metabolic Panel; Future    4. Sleep apnea in adult    5. Frequency of micturition   -     tadalafil " (CIALIS) 5 MG tablet; Take 1 tablet by mouth Daily.  Dispense: 90 tablet; Refill: 3  -     PSA DIAGNOSTIC; Future    6. Heart burn  -     pantoprazole (Protonix) 40 MG EC tablet; Take 1 tablet by mouth Daily.  Dispense: 90 tablet; Refill: 3    7. Enlarged prostate  -     PSA DIAGNOSTIC; Future       At baseline he is mildly bradycardic.  His blood pressure is stable.  His BMI is gone up to 33.  He has gained about 11 pounds in the last 6 months.  His cardiovascular examination is unremarkable    Regarding his ischemic heart disease, he is not having any anginal symptoms.  At this time we will continue the aspirin and Plavix.  I like to recheck his blood count.    Regarding hypercholesterolemia, he is on Lipitor at 20 mg.  Will recheck the level along with LFT    His blood pressure is very well-controlled with combination of labetalol, Hyzaar and Procardia XL.  Continue the same and check the electrolytes and renal function.  He is borderline bradycardic but he is asymptomatic.    Regarding the sleep apnea, he does use CPAP regularly    Regarding his enlarged prostate he has mild increase in urination.  He is taking 5 mg Cialis every day and it is helping.  Will check his PSA level    Regarding heartburn, it is subsided with Protonix so continue the same.    Overall cardiac status appears stable.  I will see him back in 6 months or sooner if needed                  Electronically signed by Ovidio Warren MD November 12, 2024 15:51 EST

## 2024-11-12 NOTE — LETTER
November 12, 2024     Edgardo Espinal MD  [address] [place] [id number]    Patient: Edgardo Ortez   YOB: 1950   Date of Visit: 11/12/2024     Dear Edgardo Espinal MD:       Thank you for referring Edgardo Ortez to me for evaluation. Below are the relevant portions of my assessment and plan of care.    If you have questions, please do not hesitate to call me. I look forward to following Edgardo along with you.         Sincerely,        Ovidio Warren MD        CC: No Recipients    Ovidio Warren MD  11/12/24 8903  Sign when Signing Visit  Chief Complaint   Patient presents with   • Follow-up     Cardiac management   • Lab     Last labs in chart.   • Med Refill     Needs refills on cardiac medications-90 day       CARDIAC COMPLAINTS  Cardiac management        Subjective  Edgardo Ortez is a 74 y.o. male came in today for his regular follow-up visit.  He has history of hypertension, hypercholesterolemia who also has ischemic heart disease.  It was first diagnosed in 2005 when he presented with chest pain and underwent stenting of the LAD and diagonal.  He had restenosis and had repeat stenting of both in 2006.  Since then he has done extremely well.  He denies having any chest pain or shortness of breath.  His last cardiac workup which was done last year showed excellent effort tolerance, normal LV function, borderline hypertensive response and no evidence of ischemia.  He came today with no new symptoms.  He is still able to do most of his activities.  He unfortunately is not able to lose the weight.  His last lab work was in March and his lipids was very well-controlled.  His A1c was normal his renal and liver function was normal.              Cardiac History  Past Surgical History:   Procedure Laterality Date   • CARDIAC CATHETERIZATION  2005    PCI of LAD & D1     • CARDIAC CATHETERIZATION  11/02/2006    75% LAD- 3.0 x 33 mm Cypher. 70% D1 2.5 x 18 mm  Cypher Stent.     • CARDIOVASCULAR STRESS TEST  05/29/2007    10 Min 75% THR.Negative     • CARDIOVASCULAR STRESS TEST  05/18/2009    10 min, 98% tHR. Negative     • CARDIOVASCULAR STRESS TEST  07/12/2010    10 min. 83% THR. BP- 160/70. Anterior Ischemia.     • CARDIOVASCULAR STRESS TEST  09/08/2011    10 Min.85%THR. Negative     • CARDIOVASCULAR STRESS TEST  08/13/2013    10 Min,15 Secs. 89% THR. BP- 172/90. Negative.     • CARDIOVASCULAR STRESS TEST  04/13/2015    10 Min, 84% THR. BP- 184/78. Negative     • CARDIOVASCULAR STRESS TEST  02/18/2019    10 Min. 12.8 METS. 88% THR. BP- 172/80. EF 60%. Negative.   • CARDIOVASCULAR STRESS TEST  02/21/2023    11 Min. 13.4 METS. 90% THR. 173/74. EF 57%. Negative   • CATARACT EXTRACTION, BILATERAL  10/2023   • CONVERTED (HISTORICAL) DUPLEX SCANS  04/13/2015    Carotid US- Normal.     • CORONARY ANGIOPLASTY     • CORONARY STENT PLACEMENT  2006   • ECHO - CONVERTED  05/29/2007     EF 65%     • ECHO - CONVERTED  05/18/2009    EF >60%, Mild MR.     • ECHO - CONVERTED  07/12/2010    EF >60%, RVSP- 32.09mmHg.     • ECHO - CONVERTED  09/08/2011     EF 65%.     • ECHO - CONVERTED  08/13/2013    EF 65%. RVSp- 39 mmHg.     • ECHO - CONVERTED  04/13/2015    EF 65%     • ECHO - CONVERTED  02/18/2019    EF 60-65%. LA- 4.6 Cm. Mild MR. RVSP- 35 mmHg.   • ECHO - CONVERTED  02/21/2023    EF 65%. LA- 4.0. Mild MR. RVSP- 28 mmHg   • US CAROTID UNILATERAL  08/13/2020    No sig stenosis       Current Outpatient Medications   Medication Sig Dispense Refill   • aspirin 81 MG EC tablet Take 1 tablet by mouth Daily. 90 tablet 3   • atorvastatin (LIPITOR) 20 MG tablet Take 1 tablet by mouth Daily. 90 tablet 3   • clopidogrel (PLAVIX) 75 MG tablet Take 1 tablet by mouth Daily. 90 tablet 3   • labetalol (NORMODYNE) 200 MG tablet Take 1 tablet by mouth Every 12 (Twelve) Hours. 180 tablet 3   • losartan-hydrochlorothiazide (HYZAAR) 100-25 MG per tablet Take 1 tablet by mouth Daily. 90 tablet 3   •  NIFEdipine XL (Procardia XL) 60 MG 24 hr tablet Take 1 tablet by mouth Daily. 90 tablet 3   • pantoprazole (Protonix) 40 MG EC tablet Take 1 tablet by mouth Daily. 90 tablet 3   • tadalafil (CIALIS) 5 MG tablet Take 1 tablet by mouth Daily. 90 tablet 3     No current facility-administered medications for this visit.       Allergies  :  Patient has no known allergies.       Past Medical History:   Diagnosis Date   • Cancer August 2019    Melanoma   • Coronary artery disease 2003?    Not sure if date or year   • H/O prostate biopsy     negative   • Hypercholesterolemia    • Hypertension    • IHD (ischemic heart disease)     S/P stenting of LAD & diagonal   • MR (mitral regurgitation)     mild   • pulse oximetry 07/07/2010    Overnight oximetry- Abnormal, sleep study ordered, pt. wants to lose wt. first.   • Sleep apnea 2006    Not sure of extact year       Social History     Socioeconomic History   • Marital status:    Tobacco Use   • Smoking status: Never     Passive exposure: Past   • Smokeless tobacco: Never   Vaping Use   • Vaping status: Never Used   Substance and Sexual Activity   • Alcohol use: Not Currently     Comment: states he drinks beer or scotch maybe 2-3 times a week.   • Drug use: No   • Sexual activity: Yes     Partners: Female     Birth control/protection: None, Vasectomy       Family History   Problem Relation Age of Onset   • Heart disease Mother    • Heart attack Mother    • Hypertension Mother    • Heart disease Father    • Heart attack Father    • Hypertension Father        Review of Systems   Constitutional: Negative for decreased appetite and malaise/fatigue.   HENT:  Negative for congestion and sore throat.    Eyes:  Negative for blurred vision, double vision and visual disturbance.   Cardiovascular:  Negative for chest pain and palpitations.   Respiratory:  Negative for shortness of breath and snoring.    Endocrine: Negative for cold intolerance and heat intolerance.  "  Hematologic/Lymphatic: Negative for adenopathy. Does not bruise/bleed easily.   Skin:  Negative for itching, nail changes and skin cancer.   Musculoskeletal:  Negative for arthritis and myalgias.   Gastrointestinal:  Negative for abdominal pain, dysphagia and heartburn.   Genitourinary:  Negative for bladder incontinence and frequency.   Neurological:  Negative for dizziness, seizures and vertigo.   Psychiatric/Behavioral:  Negative for altered mental status.    Allergic/Immunologic: Negative for environmental allergies and hives.     Diabetes- No  Thyroid- normal    Objective    /80 (BP Location: Right arm, Patient Position: Sitting)   Pulse 56   Ht 180.3 cm (70.98\")   Wt 108 kg (238 lb 9.6 oz)   BMI 33.29 kg/m²     Vitals and nursing note reviewed.   Constitutional:       Appearance: Healthy appearance. Not in distress.   Eyes:      Conjunctiva/sclera: Conjunctivae normal.      Pupils: Pupils are equal, round, and reactive to light.   HENT:      Head: Normocephalic.   Pulmonary:      Effort: Pulmonary effort is normal.      Breath sounds: Normal breath sounds.   Cardiovascular:      PMI at left midclavicular line. Normal rate. Regular rhythm.   Abdominal:      General: Bowel sounds are normal.      Palpations: Abdomen is soft.   Musculoskeletal: Normal range of motion.      Cervical back: Normal range of motion and neck supple. Skin:     General: Skin is warm and dry.   Neurological:      Mental Status: Alert, oriented to person, place, and time and oriented to person, place and time.   Procedures            @ASSESSMENT/PLAN@        Diagnoses and all orders for this visit:    1. IHD (ischemic heart disease) (Primary)  -     aspirin 81 MG EC tablet; Take 1 tablet by mouth Daily.  Dispense: 90 tablet; Refill: 3  -     clopidogrel (PLAVIX) 75 MG tablet; Take 1 tablet by mouth Daily.  Dispense: 90 tablet; Refill: 3  -     CBC & Differential; Future    2. Hypercholesteremia  -     atorvastatin (LIPITOR) 20 " MG tablet; Take 1 tablet by mouth Daily.  Dispense: 90 tablet; Refill: 3  -     Lipid Panel; Future    3. Essential hypertension  -     labetalol (NORMODYNE) 200 MG tablet; Take 1 tablet by mouth Every 12 (Twelve) Hours.  Dispense: 180 tablet; Refill: 3  -     losartan-hydrochlorothiazide (HYZAAR) 100-25 MG per tablet; Take 1 tablet by mouth Daily.  Dispense: 90 tablet; Refill: 3  -     NIFEdipine XL (Procardia XL) 60 MG 24 hr tablet; Take 1 tablet by mouth Daily.  Dispense: 90 tablet; Refill: 3  -     Comprehensive Metabolic Panel; Future    4. Sleep apnea in adult    5. Frequency of micturition   -     tadalafil (CIALIS) 5 MG tablet; Take 1 tablet by mouth Daily.  Dispense: 90 tablet; Refill: 3  -     PSA DIAGNOSTIC; Future    6. Heart burn  -     pantoprazole (Protonix) 40 MG EC tablet; Take 1 tablet by mouth Daily.  Dispense: 90 tablet; Refill: 3    7. Enlarged prostate  -     PSA DIAGNOSTIC; Future       At baseline he is mildly bradycardic.  His blood pressure is stable.  His BMI is gone up to 33.  He has gained about 11 pounds in the last 6 months.  His cardiovascular examination is unremarkable    Regarding his ischemic heart disease, he is not having any anginal symptoms.  At this time we will continue the aspirin and Plavix.  I like to recheck his blood count.    Regarding hypercholesterolemia, he is on Lipitor at 20 mg.  Will recheck the level along with LFT    His blood pressure is very well-controlled with combination of labetalol, Hyzaar and Procardia XL.  Continue the same and check the electrolytes and renal function.  He is borderline bradycardic but he is asymptomatic.    Regarding the sleep apnea, he does use CPAP regularly    Regarding his enlarged prostate he has mild increase in urination.  He is taking 5 mg Cialis every day and it is helping.  Will check his PSA level    Regarding heartburn, it is subsided with Protonix so continue the same.    Overall cardiac status appears stable.  I will  see him back in 6 months or sooner if needed                  Electronically signed by Ovidio Warren MD November 12, 2024 15:51 EST

## 2024-11-19 ENCOUNTER — LAB (OUTPATIENT)
Dept: LAB | Facility: HOSPITAL | Age: 74
End: 2024-11-19
Payer: MEDICARE

## 2024-11-19 DIAGNOSIS — I10 ESSENTIAL HYPERTENSION: ICD-10-CM

## 2024-11-19 DIAGNOSIS — E78.00 HYPERCHOLESTEREMIA: ICD-10-CM

## 2024-11-19 DIAGNOSIS — I25.9 IHD (ISCHEMIC HEART DISEASE): ICD-10-CM

## 2024-11-19 LAB
ALBUMIN SERPL-MCNC: 4.6 G/DL (ref 3.5–5.2)
ALBUMIN/GLOB SERPL: 1.6 G/DL
ALP SERPL-CCNC: 77 U/L (ref 39–117)
ALT SERPL W P-5'-P-CCNC: 20 U/L (ref 1–41)
ANION GAP SERPL CALCULATED.3IONS-SCNC: 11 MMOL/L (ref 5–15)
AST SERPL-CCNC: 21 U/L (ref 1–40)
BASOPHILS # BLD AUTO: 0.06 10*3/MM3 (ref 0–0.2)
BASOPHILS NFR BLD AUTO: 1.1 % (ref 0–1.5)
BILIRUB SERPL-MCNC: 0.6 MG/DL (ref 0–1.2)
BUN SERPL-MCNC: 20 MG/DL (ref 8–23)
BUN/CREAT SERPL: 16.1 (ref 7–25)
CALCIUM SPEC-SCNC: 9.6 MG/DL (ref 8.6–10.5)
CHLORIDE SERPL-SCNC: 105 MMOL/L (ref 98–107)
CHOLEST SERPL-MCNC: 163 MG/DL (ref 0–200)
CO2 SERPL-SCNC: 27 MMOL/L (ref 22–29)
CREAT SERPL-MCNC: 1.24 MG/DL (ref 0.76–1.27)
DEPRECATED RDW RBC AUTO: 42.5 FL (ref 37–54)
EGFRCR SERPLBLD CKD-EPI 2021: 61 ML/MIN/1.73
EOSINOPHIL # BLD AUTO: 0.37 10*3/MM3 (ref 0–0.4)
EOSINOPHIL NFR BLD AUTO: 6.5 % (ref 0.3–6.2)
ERYTHROCYTE [DISTWIDTH] IN BLOOD BY AUTOMATED COUNT: 13.2 % (ref 12.3–15.4)
GLOBULIN UR ELPH-MCNC: 2.9 GM/DL
GLUCOSE SERPL-MCNC: 83 MG/DL (ref 65–99)
HCT VFR BLD AUTO: 41.4 % (ref 37.5–51)
HDLC SERPL-MCNC: 44 MG/DL (ref 40–60)
HGB BLD-MCNC: 13.6 G/DL (ref 13–17.7)
IMM GRANULOCYTES # BLD AUTO: 0.01 10*3/MM3 (ref 0–0.05)
IMM GRANULOCYTES NFR BLD AUTO: 0.2 % (ref 0–0.5)
LDLC SERPL CALC-MCNC: 101 MG/DL (ref 0–100)
LDLC/HDLC SERPL: 2.25 {RATIO}
LYMPHOCYTES # BLD AUTO: 1.41 10*3/MM3 (ref 0.7–3.1)
LYMPHOCYTES NFR BLD AUTO: 25 % (ref 19.6–45.3)
MCH RBC QN AUTO: 29.2 PG (ref 26.6–33)
MCHC RBC AUTO-ENTMCNC: 32.9 G/DL (ref 31.5–35.7)
MCV RBC AUTO: 88.8 FL (ref 79–97)
MONOCYTES # BLD AUTO: 0.68 10*3/MM3 (ref 0.1–0.9)
MONOCYTES NFR BLD AUTO: 12 % (ref 5–12)
NEUTROPHILS NFR BLD AUTO: 3.12 10*3/MM3 (ref 1.7–7)
NEUTROPHILS NFR BLD AUTO: 55.2 % (ref 42.7–76)
NRBC BLD AUTO-RTO: 0 /100 WBC (ref 0–0.2)
PLATELET # BLD AUTO: 285 10*3/MM3 (ref 140–450)
PMV BLD AUTO: 9.8 FL (ref 6–12)
POTASSIUM SERPL-SCNC: 3.4 MMOL/L (ref 3.5–5.2)
PROT SERPL-MCNC: 7.5 G/DL (ref 6–8.5)
RBC # BLD AUTO: 4.66 10*6/MM3 (ref 4.14–5.8)
SODIUM SERPL-SCNC: 143 MMOL/L (ref 136–145)
TRIGL SERPL-MCNC: 99 MG/DL (ref 0–150)
VLDLC SERPL-MCNC: 18 MG/DL (ref 5–40)
WBC NRBC COR # BLD AUTO: 5.65 10*3/MM3 (ref 3.4–10.8)

## 2024-11-19 PROCEDURE — 85025 COMPLETE CBC W/AUTO DIFF WBC: CPT

## 2024-11-19 PROCEDURE — 80061 LIPID PANEL: CPT

## 2024-11-19 PROCEDURE — 84153 ASSAY OF PSA TOTAL: CPT | Performed by: INTERNAL MEDICINE

## 2024-11-19 PROCEDURE — 80053 COMPREHEN METABOLIC PANEL: CPT

## 2024-11-20 ENCOUNTER — TELEPHONE (OUTPATIENT)
Dept: CARDIOLOGY | Facility: CLINIC | Age: 74
End: 2024-11-20
Payer: MEDICARE

## 2024-11-20 RX ORDER — ATORVASTATIN CALCIUM 40 MG/1
40 TABLET, FILM COATED ORAL DAILY
Qty: 90 TABLET | Refills: 3 | Status: SHIPPED | OUTPATIENT
Start: 2024-11-20

## 2024-11-20 RX ORDER — POTASSIUM CHLORIDE 750 MG/1
10 TABLET, EXTENDED RELEASE ORAL DAILY
Qty: 90 TABLET | Refills: 3 | Status: SHIPPED | OUTPATIENT
Start: 2024-11-20

## 2024-11-20 NOTE — TELEPHONE ENCOUNTER
----- Message from Ovidio Warren sent at 11/19/2024  5:25 PM EST -----  Potassium slightly low and LDL has gone up a little  KCl 10 mEq once a day  Increase Lipitor to 40

## 2024-11-20 NOTE — PROGRESS NOTES
Pt aware of lab results and recommendations to add KCL 10 meq once a day and increase Lipitor to 40 mg daily ( to take 2 of the 20 mg tabs).Understanding voiced. He would like scripts sent to [facility] in [place].

## 2024-11-20 NOTE — TELEPHONE ENCOUNTER
Pt aware of lab results and recommendations to add KCL 10 meq once a day and increase Lipitor to 40 mg daily ( to take 2 of the 20 mg tabs until he runs out).Understanding voiced. He would like scripts sent to [facility] in [place].

## 2025-03-25 DIAGNOSIS — R35.0 FREQUENCY OF MICTURITION: ICD-10-CM

## 2025-03-25 RX ORDER — TADALAFIL 5 MG/1
5 TABLET ORAL DAILY PRN
Qty: 90 TABLET | Refills: 3 | Status: SHIPPED | OUTPATIENT
Start: 2025-03-25

## 2025-03-25 NOTE — TELEPHONE ENCOUNTER
Received refill request from [facility] for refills on tadalafil 5 mg daily as needed. 90 tablets and 3 refills are pended to be sent.

## 2025-05-19 ENCOUNTER — OFFICE VISIT (OUTPATIENT)
Dept: CARDIOLOGY | Facility: CLINIC | Age: 75
End: 2025-05-19
Payer: MEDICARE

## 2025-05-19 VITALS
SYSTOLIC BLOOD PRESSURE: 140 MMHG | DIASTOLIC BLOOD PRESSURE: 90 MMHG | HEIGHT: 71 IN | BODY MASS INDEX: 31.78 KG/M2 | WEIGHT: 227 LBS | HEART RATE: 60 BPM

## 2025-05-19 DIAGNOSIS — E88.810 METABOLIC SYNDROME: ICD-10-CM

## 2025-05-19 DIAGNOSIS — G45.9 TIA (TRANSIENT ISCHEMIC ATTACK): ICD-10-CM

## 2025-05-19 DIAGNOSIS — E78.00 HYPERCHOLESTEREMIA: ICD-10-CM

## 2025-05-19 DIAGNOSIS — I10 ESSENTIAL HYPERTENSION: ICD-10-CM

## 2025-05-19 DIAGNOSIS — R12 HEART BURN: ICD-10-CM

## 2025-05-19 DIAGNOSIS — R73.9 HYPERGLYCEMIA: ICD-10-CM

## 2025-05-19 DIAGNOSIS — I25.9 IHD (ISCHEMIC HEART DISEASE): Primary | ICD-10-CM

## 2025-05-19 DIAGNOSIS — R09.89 BRUIT OF LEFT CAROTID ARTERY: ICD-10-CM

## 2025-05-19 DIAGNOSIS — R35.0 FREQUENCY OF MICTURITION: ICD-10-CM

## 2025-05-19 DIAGNOSIS — G47.30 SLEEP APNEA IN ADULT: ICD-10-CM

## 2025-05-19 DIAGNOSIS — N40.0 ENLARGED PROSTATE: ICD-10-CM

## 2025-05-19 RX ORDER — NIFEDIPINE 90 MG/1
90 TABLET, EXTENDED RELEASE ORAL DAILY
Qty: 90 TABLET | Refills: 3 | Status: SHIPPED | OUTPATIENT
Start: 2025-05-19

## 2025-05-19 RX ORDER — LOSARTAN POTASSIUM AND HYDROCHLOROTHIAZIDE 25; 100 MG/1; MG/1
1 TABLET ORAL DAILY
Qty: 90 TABLET | Refills: 3 | Status: SHIPPED | OUTPATIENT
Start: 2025-05-19

## 2025-05-19 RX ORDER — ATORVASTATIN CALCIUM 40 MG/1
40 TABLET, FILM COATED ORAL DAILY
Qty: 90 TABLET | Refills: 3 | Status: SHIPPED | OUTPATIENT
Start: 2025-05-19

## 2025-05-19 RX ORDER — LABETALOL 200 MG/1
200 TABLET, FILM COATED ORAL EVERY 12 HOURS SCHEDULED
Qty: 180 TABLET | Refills: 3 | Status: SHIPPED | OUTPATIENT
Start: 2025-05-19

## 2025-05-19 RX ORDER — TADALAFIL 5 MG/1
5 TABLET ORAL DAILY PRN
Qty: 90 TABLET | Refills: 3 | Status: SHIPPED | OUTPATIENT
Start: 2025-05-19

## 2025-05-19 RX ORDER — PANTOPRAZOLE SODIUM 40 MG/1
40 TABLET, DELAYED RELEASE ORAL DAILY
Qty: 90 TABLET | Refills: 3 | Status: SHIPPED | OUTPATIENT
Start: 2025-05-19

## 2025-05-19 RX ORDER — POTASSIUM CHLORIDE 750 MG/1
10 TABLET, EXTENDED RELEASE ORAL DAILY
Qty: 90 TABLET | Refills: 3 | Status: SHIPPED | OUTPATIENT
Start: 2025-05-19

## 2025-05-19 RX ORDER — CLOPIDOGREL BISULFATE 75 MG/1
75 TABLET ORAL DAILY
Qty: 90 TABLET | Refills: 3 | Status: SHIPPED | OUTPATIENT
Start: 2025-05-19
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
## 2025-06-09 ENCOUNTER — LAB (OUTPATIENT)
Dept: LAB | Facility: HOSPITAL | Age: 75
End: 2025-06-09
Payer: MEDICARE

## 2025-06-09 DIAGNOSIS — I25.9 IHD (ISCHEMIC HEART DISEASE): ICD-10-CM

## 2025-06-09 DIAGNOSIS — G45.9 TIA (TRANSIENT ISCHEMIC ATTACK): ICD-10-CM

## 2025-06-09 DIAGNOSIS — I10 ESSENTIAL HYPERTENSION: ICD-10-CM

## 2025-06-09 DIAGNOSIS — E78.00 HYPERCHOLESTEREMIA: ICD-10-CM

## 2025-06-09 DIAGNOSIS — R73.9 HYPERGLYCEMIA: ICD-10-CM

## 2025-06-09 LAB
ALBUMIN SERPL-MCNC: 4.4 G/DL (ref 3.5–5.2)
ALBUMIN/GLOB SERPL: 1.6 G/DL
ALP SERPL-CCNC: 94 U/L (ref 39–117)
ALT SERPL W P-5'-P-CCNC: 24 U/L (ref 1–41)
ANION GAP SERPL CALCULATED.3IONS-SCNC: 9.6 MMOL/L (ref 5–15)
AST SERPL-CCNC: 23 U/L (ref 1–40)
BASOPHILS # BLD AUTO: 0.04 10*3/MM3 (ref 0–0.2)
BASOPHILS NFR BLD AUTO: 0.7 % (ref 0–1.5)
BILIRUB SERPL-MCNC: 0.5 MG/DL (ref 0–1.2)
BUN SERPL-MCNC: 23.8 MG/DL (ref 8–23)
BUN/CREAT SERPL: 21.8 (ref 7–25)
CALCIUM SPEC-SCNC: 9.7 MG/DL (ref 8.6–10.5)
CHLORIDE SERPL-SCNC: 103 MMOL/L (ref 98–107)
CHOLEST SERPL-MCNC: 149 MG/DL (ref 0–200)
CO2 SERPL-SCNC: 28.4 MMOL/L (ref 22–29)
CREAT SERPL-MCNC: 1.09 MG/DL (ref 0.76–1.27)
DEPRECATED RDW RBC AUTO: 43.8 FL (ref 37–54)
EGFRCR SERPLBLD CKD-EPI 2021: 70.8 ML/MIN/1.73
EOSINOPHIL # BLD AUTO: 0.35 10*3/MM3 (ref 0–0.4)
EOSINOPHIL NFR BLD AUTO: 6.5 % (ref 0.3–6.2)
ERYTHROCYTE [DISTWIDTH] IN BLOOD BY AUTOMATED COUNT: 13.2 % (ref 12.3–15.4)
GLOBULIN UR ELPH-MCNC: 2.8 GM/DL
GLUCOSE SERPL-MCNC: 102 MG/DL (ref 65–99)
HBA1C MFR BLD: 5.76 % (ref 4.8–5.6)
HCT VFR BLD AUTO: 40.8 % (ref 37.5–51)
HDLC SERPL-MCNC: 40 MG/DL (ref 40–60)
HGB BLD-MCNC: 13.1 G/DL (ref 13–17.7)
IMM GRANULOCYTES # BLD AUTO: 0.02 10*3/MM3 (ref 0–0.05)
IMM GRANULOCYTES NFR BLD AUTO: 0.4 % (ref 0–0.5)
LDLC SERPL CALC-MCNC: 89 MG/DL (ref 0–100)
LDLC/HDLC SERPL: 2.19 {RATIO}
LYMPHOCYTES # BLD AUTO: 1.01 10*3/MM3 (ref 0.7–3.1)
LYMPHOCYTES NFR BLD AUTO: 18.8 % (ref 19.6–45.3)
MAGNESIUM SERPL-MCNC: 1.8 MG/DL (ref 1.6–2.4)
MCH RBC QN AUTO: 28.8 PG (ref 26.6–33)
MCHC RBC AUTO-ENTMCNC: 32.1 G/DL (ref 31.5–35.7)
MCV RBC AUTO: 89.7 FL (ref 79–97)
MONOCYTES # BLD AUTO: 0.68 10*3/MM3 (ref 0.1–0.9)
MONOCYTES NFR BLD AUTO: 12.7 % (ref 5–12)
NEUTROPHILS NFR BLD AUTO: 3.26 10*3/MM3 (ref 1.7–7)
NEUTROPHILS NFR BLD AUTO: 60.9 % (ref 42.7–76)
NRBC BLD AUTO-RTO: 0 /100 WBC (ref 0–0.2)
PLATELET # BLD AUTO: 276 10*3/MM3 (ref 140–450)
PMV BLD AUTO: 9.9 FL (ref 6–12)
POTASSIUM SERPL-SCNC: 3.7 MMOL/L (ref 3.5–5.2)
PROT SERPL-MCNC: 7.2 G/DL (ref 6–8.5)
RBC # BLD AUTO: 4.55 10*6/MM3 (ref 4.14–5.8)
SODIUM SERPL-SCNC: 141 MMOL/L (ref 136–145)
TRIGL SERPL-MCNC: 107 MG/DL (ref 0–150)
VLDLC SERPL-MCNC: 20 MG/DL (ref 5–40)
WBC NRBC COR # BLD AUTO: 5.36 10*3/MM3 (ref 3.4–10.8)

## 2025-06-09 PROCEDURE — 80061 LIPID PANEL: CPT

## 2025-06-09 PROCEDURE — 82746 ASSAY OF FOLIC ACID SERUM: CPT

## 2025-06-09 PROCEDURE — 84153 ASSAY OF PSA TOTAL: CPT | Performed by: INTERNAL MEDICINE

## 2025-06-09 PROCEDURE — 82607 VITAMIN B-12: CPT

## 2025-06-09 PROCEDURE — 36415 COLL VENOUS BLD VENIPUNCTURE: CPT

## 2025-06-09 PROCEDURE — 83735 ASSAY OF MAGNESIUM: CPT

## 2025-06-09 PROCEDURE — 85025 COMPLETE CBC W/AUTO DIFF WBC: CPT

## 2025-06-09 PROCEDURE — 80053 COMPREHEN METABOLIC PANEL: CPT

## 2025-06-09 PROCEDURE — 83036 HEMOGLOBIN GLYCOSYLATED A1C: CPT

## 2025-06-10 LAB
FOLATE SERPL-MCNC: 7.09 NG/ML (ref 4.78–24.2)
VIT B12 BLD-MCNC: 331 PG/ML (ref 211–946)

## 2025-06-17 ENCOUNTER — TELEPHONE (OUTPATIENT)
Dept: CARDIOLOGY | Facility: CLINIC | Age: 75
End: 2025-06-17
Payer: MEDICARE

## 2025-06-17 NOTE — TELEPHONE ENCOUNTER
Ophthalmology (Dr. Arthur Moreno) requesting cardiac clearance for left lower lid lesion excision/repair with rotational flap, MAC: 20 minutes and request holding instructions for Plavix and aspirin.      Patient seen 5/19/2025.  Last stenting 2006.        Fax # [phone], Attn: Justa Shanks

## 2025-06-17 NOTE — LETTER
Cardiac Clearance      TO:  Ophthalmology (Dr. Arthur Moreno)  Fax: [phone]      DATE: 2025    This patient Edgardo CLAIR Ortez  : 1950    HAS BEEN cleared for left lower lid lesion excision/repair with rotational flap, MAC: 20 minutes     using usual precautions with low risk of developing complications.      Notes: Patient can hold Plavix and aspirin x 5 days          Electronically signed by Ovidio Warren MD    2025   11:11 EDT